# Patient Record
Sex: FEMALE | Race: WHITE | NOT HISPANIC OR LATINO | Employment: FULL TIME | URBAN - NONMETROPOLITAN AREA
[De-identification: names, ages, dates, MRNs, and addresses within clinical notes are randomized per-mention and may not be internally consistent; named-entity substitution may affect disease eponyms.]

---

## 2018-01-18 ENCOUNTER — APPOINTMENT (OUTPATIENT)
Dept: GENERAL RADIOLOGY | Facility: HOSPITAL | Age: 33
End: 2018-01-18

## 2018-01-18 PROCEDURE — 73130 X-RAY EXAM OF HAND: CPT

## 2018-05-15 ENCOUNTER — OFFICE VISIT (OUTPATIENT)
Dept: OBSTETRICS AND GYNECOLOGY | Facility: CLINIC | Age: 33
End: 2018-05-15

## 2018-05-15 VITALS
WEIGHT: 214 LBS | BODY MASS INDEX: 37.92 KG/M2 | DIASTOLIC BLOOD PRESSURE: 80 MMHG | SYSTOLIC BLOOD PRESSURE: 128 MMHG | HEIGHT: 63 IN

## 2018-05-15 DIAGNOSIS — F41.9 ANXIETY: Primary | ICD-10-CM

## 2018-05-15 DIAGNOSIS — E28.2 PCOS (POLYCYSTIC OVARIAN SYNDROME): ICD-10-CM

## 2018-05-15 PROCEDURE — 99214 OFFICE O/P EST MOD 30 MIN: CPT | Performed by: NURSE PRACTITIONER

## 2018-05-15 RX ORDER — ALPRAZOLAM 0.5 MG/1
0.5 TABLET ORAL 3 TIMES DAILY PRN
Qty: 60 TABLET | Refills: 0 | Status: SHIPPED | OUTPATIENT
Start: 2018-05-15 | End: 2019-04-05

## 2018-05-15 RX ORDER — HYDROXYZINE PAMOATE 25 MG/1
25 CAPSULE ORAL 2 TIMES DAILY
COMMUNITY
End: 2019-04-05

## 2018-05-15 NOTE — PROGRESS NOTES
Subjective   Carmen Gonzalez is a 33 y.o. female  YOB: 1985      Chief Complaint   Patient presents with   • Anxiety     Patient here with c/o anxiety. Patient takes vistoril daily but patient is still suffering with panic attacks.        Anxiety   Presents for initial visit. Onset was 1 to 4 weeks ago. The problem has been gradually worsening. Symptoms include nervous/anxious behavior. Patient reports no chest pain, confusion, dizziness, nausea, palpitations, shortness of breath or suicidal ideas. Symptoms occur most days. The severity of symptoms is interfering with daily activities. The symptoms are aggravated by family issues and work stress.     Risk factors include marital problems and a major life event. There is no history of anxiety/panic attacks, bipolar disorder, depression or suicide attempts. Treatments tried: Vistoril. The treatment provided no relief. Compliance with prior treatments has been good.       The following portions of the patient's history were reviewed and updated as appropriate: allergies, current medications, past family history, past medical history, past social history, past surgical history and problem list.    Allergies   Allergen Reactions   • Sulfa Antibiotics        Past Medical History:   Diagnosis Date   • PCOS (polycystic ovarian syndrome)        Family History   Problem Relation Age of Onset   • Breast cancer Neg Hx    • Ovarian cancer Neg Hx    • Colon cancer Neg Hx        Social History     Social History   • Marital status:      Spouse name: N/A   • Number of children: N/A   • Years of education: N/A     Occupational History   • Not on file.     Social History Main Topics   • Smoking status: Never Smoker   • Smokeless tobacco: Never Used   • Alcohol use No   • Drug use: No   • Sexual activity: Defer     Other Topics Concern   • Not on file     Social History Narrative   • No narrative on file         Current Outpatient Prescriptions:   •   hydrOXYzine (VISTARIL) 25 MG capsule, Take 25 mg by mouth 2 (Two) Times a Day., Disp: , Rfl:   •  ALPRAZolam (XANAX) 0.5 MG tablet, Take 1 tablet by mouth 3 (Three) Times a Day As Needed for Anxiety., Disp: 60 tablet, Rfl: 0    Patient's last menstrual period was 2018 (exact date).    Sexual History:         Could not be calculated    Past Surgical History:   Procedure Laterality Date   • CERVICAL BIOPSY  W/ LOOP ELECTRODE EXCISION     •  SECTION     • KIDNEY SURGERY     • TUBAL ABDOMINAL LIGATION         Review of Systems   Constitutional: Negative for activity change, appetite change, fatigue and fever.   HENT: Negative for ear pain, hearing loss, sore throat and trouble swallowing.    Eyes: Negative for pain, discharge and visual disturbance.   Respiratory: Negative for apnea, cough, chest tightness and shortness of breath.    Cardiovascular: Negative for chest pain and palpitations.   Gastrointestinal: Negative for abdominal distention, abdominal pain, blood in stool, constipation, diarrhea, nausea and vomiting.   Endocrine: Negative for heat intolerance, polydipsia and polyuria.   Genitourinary: Negative for difficulty urinating, dyspareunia, dysuria, frequency, menstrual problem, pelvic pain, urgency, vaginal bleeding, vaginal discharge and vaginal pain.   Musculoskeletal: Negative for arthralgias, back pain, joint swelling and myalgias.   Skin: Negative for rash and wound.   Allergic/Immunologic: Negative for environmental allergies and immunocompromised state.   Neurological: Negative for dizziness, tremors, seizures, numbness and headaches.   Hematological: Negative for adenopathy. Does not bruise/bleed easily.   Psychiatric/Behavioral: Negative for agitation, confusion, sleep disturbance and suicidal ideas. The patient is nervous/anxious.        Objective   Physical Exam   Constitutional: She is oriented to person, place, and time. She appears well-developed and well-nourished.  "  Cardiovascular: Normal rate and regular rhythm.    Pulmonary/Chest: Effort normal and breath sounds normal.   Neurological: She is alert and oriented to person, place, and time.   Psychiatric: She has a normal mood and affect. Her behavior is normal.   Nursing note and vitals reviewed.        Vitals:    05/15/18 1123   BP: 128/80   Weight: 97.1 kg (214 lb)   Height: 160 cm (63\")       Carmen was seen today for anxiety.    Diagnoses and all orders for this visit:    Anxiety  Comments:  Patient reports that she has a lot going on in her life (divorce for one) and has starting experiencing anxiety and panic attacks.  Discussed treatment options and risks/benefits.  RX for prn Xanax given.  To RTO in 2 weeks for follow up.  Will consider starting Effexor at that time.    Orders:  -     ALPRAZolam (XANAX) 0.5 MG tablet; Take 1 tablet by mouth 3 (Three) Times a Day As Needed for Anxiety.    PCOS (polycystic ovarian syndrome)  Comments:  Patient was diagnosed with PCOS in the past but has not had labs or taken meds recently.  PCOS labs ordered.  Patient to come in for labs when fasting.    Orders:  -     Comprehensive Metabolic Panel  -     Estradiol  -     Follicle Stimulating Hormone  -     Insulin, Total  -     Luteinizing Hormone  -     Progesterone  -     Testosterone  -     DHEA-Sulfate  -     Cortisol  -     Hemoglobin A1c          Patient's Body mass index is 37.91 kg/m². BMI is above normal parameters. Recommendations include: exercise counseling and nutrition counseling.             Non-Smoker    MyChart Instructions Given       "

## 2018-07-27 ENCOUNTER — APPOINTMENT (OUTPATIENT)
Dept: CT IMAGING | Facility: HOSPITAL | Age: 33
End: 2018-07-27

## 2018-07-27 ENCOUNTER — HOSPITAL ENCOUNTER (EMERGENCY)
Facility: HOSPITAL | Age: 33
Discharge: HOME | End: 2018-07-27
Attending: EMERGENCY MEDICINE | Admitting: EMERGENCY MEDICINE

## 2018-07-27 VITALS
BODY MASS INDEX: 37.56 KG/M2 | HEART RATE: 88 BPM | SYSTOLIC BLOOD PRESSURE: 116 MMHG | RESPIRATION RATE: 16 BRPM | HEIGHT: 63 IN | WEIGHT: 212 LBS | OXYGEN SATURATION: 98 % | TEMPERATURE: 98.7 F | DIASTOLIC BLOOD PRESSURE: 75 MMHG

## 2018-07-27 DIAGNOSIS — G43.809 OTHER MIGRAINE WITHOUT STATUS MIGRAINOSUS, NOT INTRACTABLE: Primary | ICD-10-CM

## 2018-07-27 LAB
ANION GAP SERPL CALCULATED.3IONS-SCNC: 14 MMOL/L (ref 4–13)
APTT PPP: 25.5 SECONDS (ref 24.1–34.8)
BASOPHILS # BLD AUTO: 0.05 10*3/MM3 (ref 0–0.2)
BASOPHILS NFR BLD AUTO: 0.4 % (ref 0–2)
BUN BLD-MCNC: 14 MG/DL (ref 5–21)
BUN/CREAT SERPL: 19.2 (ref 7–25)
CALCIUM SPEC-SCNC: 9.6 MG/DL (ref 8.4–10.4)
CHLORIDE SERPL-SCNC: 101 MMOL/L (ref 98–110)
CO2 SERPL-SCNC: 24 MMOL/L (ref 24–31)
CREAT BLD-MCNC: 0.73 MG/DL (ref 0.5–1.4)
DEPRECATED RDW RBC AUTO: 43.3 FL (ref 40–54)
EOSINOPHIL # BLD AUTO: 0.15 10*3/MM3 (ref 0–0.7)
EOSINOPHIL NFR BLD AUTO: 1.3 % (ref 0–4)
ERYTHROCYTE [DISTWIDTH] IN BLOOD BY AUTOMATED COUNT: 13.6 % (ref 12–15)
GFR SERPL CREATININE-BSD FRML MDRD: 91 ML/MIN/1.73
GLUCOSE BLD-MCNC: 138 MG/DL (ref 70–100)
HCT VFR BLD AUTO: 39.6 % (ref 37–47)
HGB BLD-MCNC: 13 G/DL (ref 12–16)
IMM GRANULOCYTES # BLD: 0.07 10*3/MM3 (ref 0–0.03)
IMM GRANULOCYTES NFR BLD: 0.6 % (ref 0–5)
INR PPP: 0.86 (ref 0.91–1.09)
LYMPHOCYTES # BLD AUTO: 2.17 10*3/MM3 (ref 0.72–4.86)
LYMPHOCYTES NFR BLD AUTO: 18.3 % (ref 15–45)
MCH RBC QN AUTO: 28.5 PG (ref 28–32)
MCHC RBC AUTO-ENTMCNC: 32.8 G/DL (ref 33–36)
MCV RBC AUTO: 86.8 FL (ref 82–98)
MONOCYTES # BLD AUTO: 0.56 10*3/MM3 (ref 0.19–1.3)
MONOCYTES NFR BLD AUTO: 4.7 % (ref 4–12)
NEUTROPHILS # BLD AUTO: 8.84 10*3/MM3 (ref 1.87–8.4)
NEUTROPHILS NFR BLD AUTO: 74.7 % (ref 39–78)
NRBC BLD MANUAL-RTO: 0 /100 WBC (ref 0–0)
PLATELET # BLD AUTO: 289 10*3/MM3 (ref 130–400)
PMV BLD AUTO: 10.9 FL (ref 6–12)
POTASSIUM BLD-SCNC: 4.2 MMOL/L (ref 3.5–5.3)
PROTHROMBIN TIME: 12 SECONDS (ref 11.9–14.6)
RBC # BLD AUTO: 4.56 10*6/MM3 (ref 4.2–5.4)
SODIUM BLD-SCNC: 139 MMOL/L (ref 135–145)
WBC NRBC COR # BLD: 11.84 10*3/MM3 (ref 4.8–10.8)

## 2018-07-27 PROCEDURE — 25010000002 DIPHENHYDRAMINE PER 50 MG: Performed by: EMERGENCY MEDICINE

## 2018-07-27 PROCEDURE — 99283 EMERGENCY DEPT VISIT LOW MDM: CPT

## 2018-07-27 PROCEDURE — 25010000002 DEXAMETHASONE PER 1 MG: Performed by: EMERGENCY MEDICINE

## 2018-07-27 PROCEDURE — 85610 PROTHROMBIN TIME: CPT | Performed by: EMERGENCY MEDICINE

## 2018-07-27 PROCEDURE — 25010000002 METOCLOPRAMIDE PER 10 MG: Performed by: EMERGENCY MEDICINE

## 2018-07-27 PROCEDURE — 25010000002 PROCHLORPERAZINE EDISYLATE PER 10 MG: Performed by: EMERGENCY MEDICINE

## 2018-07-27 PROCEDURE — 96365 THER/PROPH/DIAG IV INF INIT: CPT

## 2018-07-27 PROCEDURE — 80048 BASIC METABOLIC PNL TOTAL CA: CPT | Performed by: EMERGENCY MEDICINE

## 2018-07-27 PROCEDURE — 96375 TX/PRO/DX INJ NEW DRUG ADDON: CPT

## 2018-07-27 PROCEDURE — 25010000002 MAGNESIUM SULFATE IN D5W 1G/100ML (PREMIX) 1-5 GM/100ML-% SOLUTION: Performed by: EMERGENCY MEDICINE

## 2018-07-27 PROCEDURE — 70450 CT HEAD/BRAIN W/O DYE: CPT

## 2018-07-27 PROCEDURE — 85730 THROMBOPLASTIN TIME PARTIAL: CPT | Performed by: EMERGENCY MEDICINE

## 2018-07-27 PROCEDURE — 85025 COMPLETE CBC W/AUTO DIFF WBC: CPT | Performed by: EMERGENCY MEDICINE

## 2018-07-27 RX ORDER — METOCLOPRAMIDE HYDROCHLORIDE 5 MG/ML
10 INJECTION INTRAMUSCULAR; INTRAVENOUS ONCE
Status: COMPLETED | OUTPATIENT
Start: 2018-07-27 | End: 2018-07-27

## 2018-07-27 RX ORDER — DEXAMETHASONE SODIUM PHOSPHATE 10 MG/ML
10 INJECTION INTRAMUSCULAR; INTRAVENOUS ONCE
Status: COMPLETED | OUTPATIENT
Start: 2018-07-27 | End: 2018-07-27

## 2018-07-27 RX ORDER — MAGNESIUM SULFATE 1 G/100ML
2 INJECTION INTRAVENOUS ONCE
Status: COMPLETED | OUTPATIENT
Start: 2018-07-27 | End: 2018-07-27

## 2018-07-27 RX ORDER — DIPHENHYDRAMINE HYDROCHLORIDE 50 MG/ML
25 INJECTION INTRAMUSCULAR; INTRAVENOUS ONCE
Status: COMPLETED | OUTPATIENT
Start: 2018-07-27 | End: 2018-07-27

## 2018-07-27 RX ADMIN — METOCLOPRAMIDE 10 MG: 5 INJECTION, SOLUTION INTRAMUSCULAR; INTRAVENOUS at 05:37

## 2018-07-27 RX ADMIN — DIPHENHYDRAMINE HYDROCHLORIDE 25 MG: 50 INJECTION, SOLUTION INTRAMUSCULAR; INTRAVENOUS at 05:38

## 2018-07-27 RX ADMIN — DEXAMETHASONE SODIUM PHOSPHATE 10 MG: 10 INJECTION INTRAMUSCULAR; INTRAVENOUS at 06:08

## 2018-07-27 RX ADMIN — MAGNESIUM SULFATE HEPTAHYDRATE 2 G: 1 INJECTION, SOLUTION INTRAVENOUS at 06:59

## 2018-07-27 RX ADMIN — SODIUM CHLORIDE 1000 ML: 9 INJECTION, SOLUTION INTRAVENOUS at 05:35

## 2018-07-27 RX ADMIN — PROCHLORPERAZINE EDISYLATE 10 MG: 5 INJECTION INTRAMUSCULAR; INTRAVENOUS at 06:55

## 2018-09-14 ENCOUNTER — OFFICE VISIT (OUTPATIENT)
Dept: OBSTETRICS AND GYNECOLOGY | Facility: CLINIC | Age: 33
End: 2018-09-14

## 2018-09-14 VITALS
SYSTOLIC BLOOD PRESSURE: 124 MMHG | BODY MASS INDEX: 36.86 KG/M2 | HEIGHT: 63 IN | WEIGHT: 208 LBS | DIASTOLIC BLOOD PRESSURE: 72 MMHG

## 2018-09-14 DIAGNOSIS — E28.2 PCOS (POLYCYSTIC OVARIAN SYNDROME): ICD-10-CM

## 2018-09-14 DIAGNOSIS — Z13.21 ENCOUNTER FOR VITAMIN DEFICIENCY SCREENING: ICD-10-CM

## 2018-09-14 DIAGNOSIS — R53.83 OTHER FATIGUE: ICD-10-CM

## 2018-09-14 DIAGNOSIS — Z01.419 WELL WOMAN EXAM WITH ROUTINE GYNECOLOGICAL EXAM: Primary | ICD-10-CM

## 2018-09-14 DIAGNOSIS — Z12.4 CERVICAL CANCER SCREENING: ICD-10-CM

## 2018-09-14 PROCEDURE — 99395 PREV VISIT EST AGE 18-39: CPT | Performed by: NURSE PRACTITIONER

## 2018-09-14 PROCEDURE — 87491 CHLMYD TRACH DNA AMP PROBE: CPT | Performed by: NURSE PRACTITIONER

## 2018-09-14 PROCEDURE — 87591 N.GONORRHOEAE DNA AMP PROB: CPT | Performed by: NURSE PRACTITIONER

## 2018-09-14 PROCEDURE — 87661 TRICHOMONAS VAGINALIS AMPLIF: CPT | Performed by: NURSE PRACTITIONER

## 2018-09-14 PROCEDURE — 87529 HSV DNA AMP PROBE: CPT | Performed by: NURSE PRACTITIONER

## 2018-09-14 PROCEDURE — G0123 SCREEN CERV/VAG THIN LAYER: HCPCS | Performed by: NURSE PRACTITIONER

## 2018-09-14 RX ORDER — MULTIPLE VITAMINS W/ MINERALS TAB 9MG-400MCG
1 TAB ORAL DAILY
COMMUNITY
End: 2019-04-05

## 2018-09-14 NOTE — PROGRESS NOTES
Attempted to obtain health maintenance information, patient unable to provide answers for the following items Mammogram, Tdap, Colonoscopy, Pneumococcal Vaccine, Medicare Annual Wellness Exam, Diabetic Eye Exam, Diabetic Foot Exam, HPV Vaccine, Chlamydia Screening  and Zoster Vaccine.

## 2018-09-14 NOTE — PROGRESS NOTES
Subjective   Carmen Gonzalez is a 33 y.o. female  YOB: 1985        Chief Complaint   Patient presents with   • Gynecologic Exam     Pt here for yearly no probs.       Gynecologic Exam   The patient's pertinent negatives include no pelvic pain. Pertinent negatives include no abdominal pain, back pain, constipation, diarrhea, dysuria, fever, frequency, hematuria, nausea, rash, sore throat, urgency or vomiting.       The following portions of the patient's history were reviewed and updated as appropriate: allergies, current medications, past family history, past medical history, past social history, past surgical history and problem list.    Allergies   Allergen Reactions   • Sulfa Antibiotics Anaphylaxis       Past Medical History:   Diagnosis Date   • PCOS (polycystic ovarian syndrome)        Family History   Problem Relation Age of Onset   • No Known Problems Father    • Endometrial cancer Mother    • No Known Problems Brother    • Breast cancer Neg Hx    • Ovarian cancer Neg Hx    • Colon cancer Neg Hx        Social History     Social History   • Marital status:      Spouse name: N/A   • Number of children: N/A   • Years of education: N/A     Occupational History   • Not on file.     Social History Main Topics   • Smoking status: Never Smoker   • Smokeless tobacco: Never Used   • Alcohol use No   • Drug use: No   • Sexual activity: Yes     Other Topics Concern   • Not on file     Social History Narrative   • No narrative on file         Current Outpatient Prescriptions:   •  Multiple Vitamins-Minerals (MULTIVITAMIN WITH MINERALS) tablet tablet, Take 1 tablet by mouth Daily., Disp: , Rfl:   •  ALPRAZolam (XANAX) 0.5 MG tablet, Take 1 tablet by mouth 3 (Three) Times a Day As Needed for Anxiety., Disp: 60 tablet, Rfl: 0  •  hydrOXYzine (VISTARIL) 25 MG capsule, Take 25 mg by mouth 2 (Two) Times a Day., Disp: , Rfl:     Patient's last menstrual period was 09/09/2018 (exact date).    Sexual  History:         Could not be calculated    Past Surgical History:   Procedure Laterality Date   • CERVICAL BIOPSY  W/ LOOP ELECTRODE EXCISION     •  SECTION     • KIDNEY SURGERY     • TUBAL ABDOMINAL LIGATION         Review of Systems   Constitutional: Negative for activity change, appetite change, fatigue, fever, unexpected weight gain and unexpected weight loss.   HENT: Negative for congestion, ear pain, hearing loss, nosebleeds, rhinorrhea, sore throat, tinnitus and trouble swallowing.    Eyes: Negative for blurred vision, pain, discharge, itching and visual disturbance.   Respiratory: Negative for apnea, chest tightness, shortness of breath and wheezing.    Cardiovascular: Negative for chest pain and leg swelling.   Gastrointestinal: Negative for abdominal pain, blood in stool, constipation, diarrhea, nausea, vomiting and GERD.   Endocrine: Negative for heat intolerance, polydipsia and polyuria.   Genitourinary: Negative for urinary incontinence, decreased libido, difficulty urinating, dyspareunia, dysuria, frequency, genital sores, hematuria, menstrual problem, pelvic pain, urgency, vaginal pain and breast lump.   Musculoskeletal: Negative for arthralgias, back pain, joint swelling and myalgias.   Skin: Negative for color change, rash and skin lesions.   Allergic/Immunologic: Negative for environmental allergies, food allergies and immunocompromised state.   Neurological: Negative for dizziness, tremors, seizures, syncope, facial asymmetry, numbness and headache.   Hematological: Negative for adenopathy. Does not bruise/bleed easily.   Psychiatric/Behavioral: Negative for agitation, hallucinations, sleep disturbance, suicidal ideas and depressed mood. The patient is not nervous/anxious.        Objective   Physical Exam   Constitutional: She is oriented to person, place, and time. She appears well-developed and well-nourished. No distress.   HENT:   Head: Normocephalic.   Right Ear: External ear  normal.   Left Ear: External ear normal.   Nose: Nose normal.   Mouth/Throat: Oropharynx is clear and moist.   Eyes: Conjunctivae are normal. Right eye exhibits no discharge. Left eye exhibits no discharge. No scleral icterus.   Neck: Normal range of motion. Neck supple. Carotid bruit is not present. No tracheal deviation present. No thyromegaly present.   Cardiovascular: Normal rate, regular rhythm, normal heart sounds and intact distal pulses.    No murmur heard.  Pulmonary/Chest: Effort normal and breath sounds normal. No respiratory distress. She has no wheezes. Right breast exhibits no inverted nipple, no mass, no nipple discharge, no skin change and no tenderness. Left breast exhibits no inverted nipple, no mass, no nipple discharge, no skin change and no tenderness. Breasts are symmetrical. There is no breast swelling.   Abdominal: Soft. She exhibits no distension and no mass. There is no tenderness. There is no guarding. No hernia. Hernia confirmed negative in the right inguinal area and confirmed negative in the left inguinal area.   Genitourinary: Rectum normal, vagina normal and uterus normal. Rectal exam shows no mass. No breast tenderness, discharge or bleeding. Pelvic exam was performed with patient supine. There is no rash, tenderness, lesion or injury on the right labia. There is no rash, tenderness, lesion or injury on the left labia. Uterus is not enlarged, not fixed and not tender. Cervix exhibits no motion tenderness, no discharge and no friability. Right adnexum displays no mass, no tenderness and no fullness. Left adnexum displays no mass, no tenderness and no fullness. No erythema, tenderness or bleeding in the vagina. No foreign body in the vagina. No signs of injury around the vagina. No vaginal discharge found.   Genitourinary Comments:   BSU normal  Urethral meatus  Normal  Perineum  Normal   Musculoskeletal: Normal range of motion. She exhibits no edema or tenderness.   Lymphadenopathy:  "       Head (right side): No submental, no submandibular, no tonsillar, no preauricular, no posterior auricular and no occipital adenopathy present.        Head (left side): No submental, no submandibular, no tonsillar, no preauricular, no posterior auricular and no occipital adenopathy present.     She has no cervical adenopathy.        Right cervical: No superficial cervical, no deep cervical and no posterior cervical adenopathy present.       Left cervical: No superficial cervical, no deep cervical and no posterior cervical adenopathy present.     She has no axillary adenopathy.        Right: No inguinal adenopathy present.        Left: No inguinal adenopathy present.   Neurological: She is alert and oriented to person, place, and time. Coordination normal.   Skin: Skin is warm and dry. No bruising and no rash noted. She is not diaphoretic. No erythema.   Psychiatric: She has a normal mood and affect. Her behavior is normal. Judgment and thought content normal.   Nursing note and vitals reviewed.        Vitals:    09/14/18 0930   BP: 124/72   Weight: 94.3 kg (208 lb)   Height: 160 cm (63\")       Carmen was seen today for gynecologic exam.    Diagnoses and all orders for this visit:    Well woman exam with routine gynecological exam  Comments:  Normal well woman exam.  Thin prep pap smear done.    Orders:  -     T4, Free  -     CBC & Differential  -     Lipid Panel With LDL / HDL Ratio  -     TSH  -     Comprehensive Metabolic Panel    Cervical cancer screening  Comments:  Thin prep pap smear done.     PCOS (polycystic ovarian syndrome)  Comments:  Patient had a lab order for PCOS labs in May of this year but never had them done.  Is fasting today and wants to have labs done.  PCOS panel ordered.   Orders:  -     Estradiol  -     Follicle Stimulating Hormone  -     Insulin, Total  -     Luteinizing Hormone  -     Progesterone  -     Testosterone  -     DHEA-Sulfate  -     Cortisol  -     Hemoglobin A1c    Encounter " for vitamin deficiency screening  Comments:  Vitamin D retesting ordered.   Orders:  -     Vitamin D 25 Hydroxy    Other fatigue  Comments:  Yearly labs done.   Orders:  -     T4, Free  -     CBC & Differential  -     Lipid Panel With LDL / HDL Ratio  -     TSH  -     Comprehensive Metabolic Panel  -     Estradiol  -     Follicle Stimulating Hormone  -     Insulin, Total  -     Luteinizing Hormone  -     Progesterone  -     Testosterone  -     DHEA-Sulfate  -     Cortisol  -     Hemoglobin A1c          Patient's Body mass index is 36.85 kg/m². BMI is above normal parameters. Recommendations include: exercise counseling and nutrition counseling.             Non-Smoker    MyChart Instructions Given

## 2018-09-17 LAB
25(OH)D3+25(OH)D2 SERPL-MCNC: 40.1 NG/ML (ref 30–100)
ALBUMIN SERPL-MCNC: 4.3 G/DL (ref 3.5–5)
ALBUMIN/GLOB SERPL: 1.5 G/DL (ref 1.1–2.5)
ALP SERPL-CCNC: 67 U/L (ref 24–120)
ALT SERPL-CCNC: 29 U/L (ref 0–54)
AST SERPL-CCNC: 18 U/L (ref 7–45)
BASOPHILS # BLD AUTO: 0.04 10*3/MM3 (ref 0–0.2)
BASOPHILS NFR BLD AUTO: 0.6 % (ref 0–2)
BILIRUB SERPL-MCNC: 0.5 MG/DL (ref 0.1–1)
BUN SERPL-MCNC: 15 MG/DL (ref 5–21)
BUN/CREAT SERPL: 23.4 (ref 7–25)
CALCIUM SERPL-MCNC: 9.7 MG/DL (ref 8.4–10.4)
CHLORIDE SERPL-SCNC: 105 MMOL/L (ref 98–110)
CHOLEST SERPL-MCNC: 195 MG/DL (ref 130–200)
CO2 SERPL-SCNC: 27 MMOL/L (ref 24–31)
CORTIS SERPL-MCNC: 13.5 UG/DL
CREAT SERPL-MCNC: 0.64 MG/DL (ref 0.5–1.4)
DHEA-S SERPL-MCNC: 504.9 UG/DL (ref 84.8–378)
EOSINOPHIL # BLD AUTO: 0.12 10*3/MM3 (ref 0–0.7)
EOSINOPHIL NFR BLD AUTO: 1.7 % (ref 0–4)
ERYTHROCYTE [DISTWIDTH] IN BLOOD BY AUTOMATED COUNT: 14.1 % (ref 12–15)
ESTRADIOL SERPL-MCNC: 35.5 PG/ML
FSH SERPL-ACNC: 7.4 MIU/ML
GLOBULIN SER CALC-MCNC: 2.9 GM/DL
GLUCOSE SERPL-MCNC: 92 MG/DL (ref 70–100)
HBA1C MFR BLD: 5.5 %
HCT VFR BLD AUTO: 40.6 % (ref 37–47)
HDLC SERPL-MCNC: 70 MG/DL
HGB BLD-MCNC: 12.8 G/DL (ref 12–16)
IMM GRANULOCYTES # BLD: 0.01 10*3/MM3 (ref 0–0.03)
IMM GRANULOCYTES NFR BLD: 0.1 % (ref 0–5)
INSULIN SERPL-ACNC: 14.8 UIU/ML (ref 2.6–24.9)
LDLC SERPL CALC-MCNC: 116 MG/DL (ref 0–99)
LDLC/HDLC SERPL: 1.66 {RATIO}
LH SERPL-ACNC: 4.9 MIU/ML
LYMPHOCYTES # BLD AUTO: 2.2 10*3/MM3 (ref 0.72–4.86)
LYMPHOCYTES NFR BLD AUTO: 31.5 % (ref 15–45)
MCH RBC QN AUTO: 29 PG (ref 28–32)
MCHC RBC AUTO-ENTMCNC: 31.5 G/DL (ref 33–36)
MCV RBC AUTO: 91.9 FL (ref 82–98)
MONOCYTES # BLD AUTO: 0.32 10*3/MM3 (ref 0.19–1.3)
MONOCYTES NFR BLD AUTO: 4.6 % (ref 4–12)
NEUTROPHILS # BLD AUTO: 4.3 10*3/MM3 (ref 1.87–8.4)
NEUTROPHILS NFR BLD AUTO: 61.5 % (ref 39–78)
NRBC BLD AUTO-RTO: 0 /100 WBC (ref 0–0)
PLATELET # BLD AUTO: 293 10*3/MM3 (ref 130–400)
POTASSIUM SERPL-SCNC: 4.6 MMOL/L (ref 3.5–5.3)
PROGEST SERPL-MCNC: 0.3 NG/ML
PROT SERPL-MCNC: 7.2 G/DL (ref 6.3–8.7)
RBC # BLD AUTO: 4.42 10*6/MM3 (ref 4.2–5.4)
SODIUM SERPL-SCNC: 141 MMOL/L (ref 135–145)
T4 FREE SERPL-MCNC: 1.09 NG/DL (ref 0.78–2.19)
TESTOST SERPL-MCNC: 85 NG/DL (ref 8–48)
TRIGL SERPL-MCNC: 43 MG/DL (ref 0–149)
TSH SERPL DL<=0.005 MIU/L-ACNC: 1.32 MIU/ML (ref 0.47–4.68)
VLDLC SERPL CALC-MCNC: 8.6 MG/DL
WBC # BLD AUTO: 6.99 10*3/MM3 (ref 4.8–10.8)

## 2018-09-20 DIAGNOSIS — E28.1 ELEVATED ANDROGEN LEVELS: Primary | ICD-10-CM

## 2018-09-20 RX ORDER — SPIRONOLACTONE 25 MG/1
25 TABLET ORAL 2 TIMES DAILY
Qty: 60 TABLET | Refills: 2 | Status: SHIPPED | OUTPATIENT
Start: 2018-09-20 | End: 2019-04-05

## 2018-09-21 LAB
GEN CATEG CVX/VAG CYTO-IMP: NORMAL
LAB AP CASE REPORT: NORMAL
LAB AP GYN ADDITIONAL INFORMATION: NORMAL
LAB AP GYN OTHER FINDINGS: NORMAL
PATH INTERP SPEC-IMP: NORMAL
STAT OF ADQ CVX/VAG CYTO-IMP: NORMAL

## 2019-03-19 ENCOUNTER — OFFICE VISIT (OUTPATIENT)
Dept: OBSTETRICS AND GYNECOLOGY | Facility: CLINIC | Age: 34
End: 2019-03-19

## 2019-03-19 VITALS
DIASTOLIC BLOOD PRESSURE: 84 MMHG | BODY MASS INDEX: 39.69 KG/M2 | WEIGHT: 224 LBS | SYSTOLIC BLOOD PRESSURE: 124 MMHG | HEIGHT: 63 IN

## 2019-03-19 DIAGNOSIS — Z20.2 POSSIBLE EXPOSURE TO STD: Primary | ICD-10-CM

## 2019-03-19 PROCEDURE — 87661 TRICHOMONAS VAGINALIS AMPLIF: CPT | Performed by: NURSE PRACTITIONER

## 2019-03-19 PROCEDURE — 99213 OFFICE O/P EST LOW 20 MIN: CPT | Performed by: NURSE PRACTITIONER

## 2019-03-19 PROCEDURE — 87591 N.GONORRHOEAE DNA AMP PROB: CPT | Performed by: NURSE PRACTITIONER

## 2019-03-19 PROCEDURE — 87491 CHLMYD TRACH DNA AMP PROBE: CPT | Performed by: NURSE PRACTITIONER

## 2019-03-19 NOTE — PROGRESS NOTES
Subjective   Carmen Gonzalez is a 33 y.o. female  YOB: 1985      Chief Complaint   Patient presents with   • Other     Patient woud not discuss with nurse.        Exposure to STD    The patient's pertinent negatives include no dyspareunia, dysuria or pelvic pain. Pertinent negatives include no abdominal pain, diaphoresis, fever, rectal pain, sore throat or urinary frequency.       The following portions of the patient's history were reviewed and updated as appropriate: allergies, current medications, past family history, past medical history, past social history, past surgical history and problem list.    Allergies   Allergen Reactions   • Sulfa Antibiotics Anaphylaxis       Past Medical History:   Diagnosis Date   • PCOS (polycystic ovarian syndrome)        Family History   Problem Relation Age of Onset   • No Known Problems Father    • Endometrial cancer Mother    • No Known Problems Brother    • Breast cancer Neg Hx    • Ovarian cancer Neg Hx    • Colon cancer Neg Hx        Social History     Socioeconomic History   • Marital status:      Spouse name: Not on file   • Number of children: Not on file   • Years of education: Not on file   • Highest education level: Not on file   Social Needs   • Financial resource strain: Not on file   • Food insecurity - worry: Not on file   • Food insecurity - inability: Not on file   • Transportation needs - medical: Not on file   • Transportation needs - non-medical: Not on file   Occupational History   • Not on file   Tobacco Use   • Smoking status: Never Smoker   • Smokeless tobacco: Never Used   Substance and Sexual Activity   • Alcohol use: No   • Drug use: No   • Sexual activity: Yes   Other Topics Concern   • Not on file   Social History Narrative   • Not on file         Current Outpatient Medications:   •  ALPRAZolam (XANAX) 0.5 MG tablet, Take 1 tablet by mouth 3 (Three) Times a Day As Needed for Anxiety., Disp: 60 tablet, Rfl: 0  •  hydrOXYzine  (VISTARIL) 25 MG capsule, Take 25 mg by mouth 2 (Two) Times a Day., Disp: , Rfl:   •  Multiple Vitamins-Minerals (MULTIVITAMIN WITH MINERALS) tablet tablet, Take 1 tablet by mouth Daily., Disp: , Rfl:   •  spironolactone (ALDACTONE) 25 MG tablet, Take 1 tablet by mouth 2 (Two) Times a Day., Disp: 60 tablet, Rfl: 2    No LMP recorded.    Sexual History:         Could not be calculated    Past Surgical History:   Procedure Laterality Date   • CERVICAL BIOPSY  W/ LOOP ELECTRODE EXCISION     •  SECTION     • KIDNEY SURGERY     • TUBAL ABDOMINAL LIGATION         Review of Systems   Constitutional: Negative for activity change, appetite change, chills, diaphoresis, fatigue, fever and unexpected weight change.   HENT: Negative for congestion, dental problem, drooling, ear discharge, ear pain, facial swelling, hearing loss, mouth sores, nosebleeds, postnasal drip, rhinorrhea, sinus pressure, sinus pain, sneezing, sore throat, tinnitus, trouble swallowing and voice change.    Eyes: Negative for photophobia, pain, discharge, redness, itching and visual disturbance.   Respiratory: Negative for apnea, cough, choking, chest tightness, shortness of breath, wheezing and stridor.    Cardiovascular: Negative for chest pain, palpitations and leg swelling.   Gastrointestinal: Negative for abdominal distention, abdominal pain, anal bleeding, blood in stool, constipation, diarrhea, nausea, rectal pain and vomiting.   Endocrine: Negative for cold intolerance, heat intolerance, polydipsia, polyphagia and polyuria.   Genitourinary: Negative for decreased urine volume, difficulty urinating, dyspareunia, dysuria, enuresis, flank pain, frequency, genital sores, hematuria, menstrual problem, pelvic pain, urgency, vaginal bleeding, vaginal discharge and vaginal pain.   Musculoskeletal: Negative for arthralgias, back pain, gait problem, joint swelling, myalgias, neck pain and neck stiffness.   Skin: Positive for wound (rectal  "fissure). Negative for color change, pallor and rash.   Allergic/Immunologic: Negative for environmental allergies, food allergies and immunocompromised state.   Neurological: Negative for dizziness, tremors, seizures, syncope, facial asymmetry, speech difficulty, weakness, light-headedness, numbness and headaches.   Hematological: Negative for adenopathy. Does not bruise/bleed easily.   Psychiatric/Behavioral: Negative for agitation, behavioral problems, confusion, decreased concentration, dysphoric mood, hallucinations, self-injury, sleep disturbance and suicidal ideas. The patient is not nervous/anxious and is not hyperactive.        Objective   Physical Exam   Constitutional: She is oriented to person, place, and time. She appears well-developed and well-nourished.   HENT:   Head: Normocephalic.   Eyes: Pupils are equal, round, and reactive to light.   Neck: Normal range of motion.   Cardiovascular: Normal rate and regular rhythm.   Pulmonary/Chest: Effort normal and breath sounds normal.   Abdominal: Soft.   Musculoskeletal: Normal range of motion.   Neurological: She is alert and oriented to person, place, and time.   Skin: Skin is warm and dry.   Psychiatric: She has a normal mood and affect. Her behavior is normal.   Nursing note and vitals reviewed.        Vitals:    03/19/19 0821   BP: 124/84   Weight: 102 kg (224 lb)   Height: 160 cm (63\")       Carmen was seen today for other.    Diagnoses and all orders for this visit:    Possible exposure to STD  Comments:  Patient reports her ex partner recently told her that he has genital herpes.  Patient reports an open area near her rectum but otherwise asymptomatic.  On exam, there is a small split near rectum c/w a fissure.  HSV culture done.  STD panel done - will f/u pending results.   Orders:  -     Gynecologic Fluid, Supplemental Testing  -     Herpes Simplex Typing - Swab, Vaginal/Rectum          Patient's Body mass index is 39.68 kg/m². BMI is above normal " parameters. Recommendations include: exercise counseling, nutrition counseling and pharmacological intervention.             Non-Smoker    MyChart Instructions Given

## 2019-03-20 DIAGNOSIS — A54.9 GONORRHEA: Primary | ICD-10-CM

## 2019-03-20 LAB
LAB AP CASE REPORT: NORMAL
Lab: NORMAL
TRICHOMONAS VAGINALIS PCR: NOT DETECTED

## 2019-03-20 RX ORDER — AZITHROMYCIN 500 MG/1
1000 TABLET, FILM COATED ORAL ONCE
Qty: 2 TABLET | Refills: 0 | Status: SHIPPED | OUTPATIENT
Start: 2019-03-20 | End: 2019-03-20

## 2019-03-20 RX ORDER — CEFTRIAXONE SODIUM 250 MG/1
250 INJECTION, POWDER, FOR SOLUTION INTRAMUSCULAR; INTRAVENOUS ONCE
Status: DISCONTINUED | OUTPATIENT
Start: 2019-03-20 | End: 2019-04-05

## 2019-03-20 NOTE — PROGRESS NOTES
Please let patient know that her testing was positive for gonorrhea - med has been sent to the pharmacy and she'll need to come in for Rocephin shot

## 2019-03-21 ENCOUNTER — CLINICAL SUPPORT (OUTPATIENT)
Dept: OBSTETRICS AND GYNECOLOGY | Facility: CLINIC | Age: 34
End: 2019-03-21

## 2019-03-21 DIAGNOSIS — A54.9 GONORRHEA: Primary | ICD-10-CM

## 2019-03-21 DIAGNOSIS — Z20.2 EXPOSURE TO SEXUALLY TRANSMITTED DISEASE (STD): Primary | ICD-10-CM

## 2019-03-21 LAB — HSV SPEC CULT: ABNORMAL

## 2019-03-21 PROCEDURE — 96372 THER/PROPH/DIAG INJ SC/IM: CPT | Performed by: NURSE PRACTITIONER

## 2019-03-21 RX ORDER — CEFTRIAXONE SODIUM 250 MG/1
250 INJECTION, POWDER, FOR SOLUTION INTRAMUSCULAR; INTRAVENOUS EVERY 24 HOURS
Status: COMPLETED | OUTPATIENT
Start: 2019-03-21 | End: 2019-03-21

## 2019-03-21 RX ADMIN — CEFTRIAXONE SODIUM 250 MG: 250 INJECTION, POWDER, FOR SOLUTION INTRAMUSCULAR; INTRAVENOUS at 09:32

## 2019-03-22 DIAGNOSIS — B00.9 HSV-2 (HERPES SIMPLEX VIRUS 2) INFECTION: Primary | ICD-10-CM

## 2019-03-22 LAB
HAV IGM SERPL QL IA: NEGATIVE
HBV CORE IGM SERPL QL IA: NEGATIVE
HBV SURFACE AG SERPL QL IA: NEGATIVE
HCV AB S/CO SERPL IA: <0.1 S/CO RATIO (ref 0–0.9)
HIV 1+2 AB+HIV1 P24 AG SERPL QL IA: NON REACTIVE
RPR SER QL: NON REACTIVE

## 2019-03-22 RX ORDER — VALACYCLOVIR HYDROCHLORIDE 500 MG/1
500 TABLET, FILM COATED ORAL 2 TIMES DAILY
Qty: 30 TABLET | Refills: 5 | Status: SHIPPED | OUTPATIENT
Start: 2019-03-22 | End: 2019-03-25

## 2019-03-22 NOTE — PROGRESS NOTES
Please let patient know her HSV culture was positive for herpes type 2.  Valtrex RX has been sent to her pharmacy.

## 2019-04-05 ENCOUNTER — OFFICE VISIT (OUTPATIENT)
Dept: OBSTETRICS AND GYNECOLOGY | Facility: CLINIC | Age: 34
End: 2019-04-05

## 2019-04-05 VITALS
DIASTOLIC BLOOD PRESSURE: 70 MMHG | HEIGHT: 63 IN | BODY MASS INDEX: 39.16 KG/M2 | SYSTOLIC BLOOD PRESSURE: 124 MMHG | WEIGHT: 221 LBS

## 2019-04-05 DIAGNOSIS — R30.0 DYSURIA: ICD-10-CM

## 2019-04-05 DIAGNOSIS — Z20.2 EXPOSURE TO STD: Primary | ICD-10-CM

## 2019-04-05 PROCEDURE — 87512 GARDNER VAG DNA QUANT: CPT | Performed by: NURSE PRACTITIONER

## 2019-04-05 PROCEDURE — 87491 CHLMYD TRACH DNA AMP PROBE: CPT | Performed by: NURSE PRACTITIONER

## 2019-04-05 PROCEDURE — 87481 CANDIDA DNA AMP PROBE: CPT | Performed by: NURSE PRACTITIONER

## 2019-04-05 PROCEDURE — 87798 DETECT AGENT NOS DNA AMP: CPT | Performed by: NURSE PRACTITIONER

## 2019-04-05 PROCEDURE — 87591 N.GONORRHOEAE DNA AMP PROB: CPT | Performed by: NURSE PRACTITIONER

## 2019-04-05 PROCEDURE — 99213 OFFICE O/P EST LOW 20 MIN: CPT | Performed by: NURSE PRACTITIONER

## 2019-04-05 PROCEDURE — 87529 HSV DNA AMP PROBE: CPT | Performed by: NURSE PRACTITIONER

## 2019-04-05 PROCEDURE — 87661 TRICHOMONAS VAGINALIS AMPLIF: CPT | Performed by: NURSE PRACTITIONER

## 2019-04-05 NOTE — PROGRESS NOTES
Subjective   Carmen Gonzalez is a 33 y.o. female  YOB: 1985      Chief Complaint   Patient presents with   • Follow-up     Patient here for a ANA after testing positive for gonorrhea.       Patient here for f/u ANA after being diagnosed with gonorrhea and HSV2        The following portions of the patient's history were reviewed and updated as appropriate: allergies, current medications, past family history, past medical history, past social history, past surgical history and problem list.    Allergies   Allergen Reactions   • Sulfa Antibiotics Anaphylaxis       Past Medical History:   Diagnosis Date   • PCOS (polycystic ovarian syndrome)        Family History   Problem Relation Age of Onset   • No Known Problems Father    • Endometrial cancer Mother    • No Known Problems Brother    • Breast cancer Neg Hx    • Ovarian cancer Neg Hx    • Colon cancer Neg Hx        Social History     Socioeconomic History   • Marital status:      Spouse name: Not on file   • Number of children: Not on file   • Years of education: Not on file   • Highest education level: Not on file   Tobacco Use   • Smoking status: Never Smoker   • Smokeless tobacco: Never Used   Substance and Sexual Activity   • Alcohol use: No   • Drug use: No   • Sexual activity: Yes       No current outpatient medications on file.  No current facility-administered medications for this visit.     No LMP recorded.    Sexual History:         Could not be calculated    Past Surgical History:   Procedure Laterality Date   • CERVICAL BIOPSY  W/ LOOP ELECTRODE EXCISION     •  SECTION     • KIDNEY SURGERY     • TUBAL ABDOMINAL LIGATION         Review of Systems   Constitutional: Negative for activity change, appetite change, chills, diaphoresis, fatigue, fever and unexpected weight change.   HENT: Negative for congestion, dental problem, drooling, ear discharge, ear pain, facial swelling, hearing loss, mouth sores, nosebleeds, postnasal  drip, rhinorrhea, sinus pressure, sinus pain, sneezing, sore throat, tinnitus, trouble swallowing and voice change.    Eyes: Negative for photophobia, pain, discharge, redness, itching and visual disturbance.   Respiratory: Negative for apnea, cough, choking, chest tightness, shortness of breath, wheezing and stridor.    Cardiovascular: Negative for chest pain, palpitations and leg swelling.   Gastrointestinal: Negative for abdominal distention, abdominal pain, anal bleeding, blood in stool, constipation, diarrhea, nausea, rectal pain and vomiting.   Endocrine: Negative for cold intolerance, heat intolerance, polydipsia, polyphagia and polyuria.   Genitourinary: Positive for dysuria. Negative for decreased urine volume, difficulty urinating, dyspareunia, enuresis, flank pain, frequency, genital sores, hematuria, menstrual problem, pelvic pain, urgency, vaginal bleeding, vaginal discharge and vaginal pain.   Musculoskeletal: Negative for arthralgias, back pain, gait problem, joint swelling, myalgias, neck pain and neck stiffness.   Skin: Negative for color change, pallor, rash and wound.   Allergic/Immunologic: Negative for environmental allergies, food allergies and immunocompromised state.   Neurological: Negative for dizziness, tremors, seizures, syncope, facial asymmetry, speech difficulty, weakness, light-headedness, numbness and headaches.   Hematological: Negative for adenopathy. Does not bruise/bleed easily.   Psychiatric/Behavioral: Negative for agitation, behavioral problems, confusion, decreased concentration, dysphoric mood, hallucinations, self-injury, sleep disturbance and suicidal ideas. The patient is not nervous/anxious and is not hyperactive.        Objective   Physical Exam   Constitutional: She is oriented to person, place, and time. She appears well-developed and well-nourished.   HENT:   Head: Normocephalic.   Eyes: Pupils are equal, round, and reactive to light.   Neck: Normal range of motion.  "  Cardiovascular: Normal rate and regular rhythm.   Pulmonary/Chest: Effort normal and breath sounds normal.   Abdominal: Soft.   Musculoskeletal: Normal range of motion.   Neurological: She is alert and oriented to person, place, and time.   Skin: Skin is warm and dry.   Psychiatric: She has a normal mood and affect. Her behavior is normal.   Nursing note and vitals reviewed.        Vitals:    04/05/19 1022   BP: 124/70   Weight: 100 kg (221 lb)   Height: 160 cm (63\")       Carmen was seen today for follow-up.    Diagnoses and all orders for this visit:    Exposure to STD  Comments:  Patient here for ANA after testing positive for gonorrhea, HSV and   Orders:  -     Cancel: Gynecologic Fluid, Supplemental Testing  -     Gynecologic Fluid, Supplemental Testing    Dysuria  Comments:  Patient reports ongoing dysuria.  Urine culture done today - will f/u pending results.   Orders:  -     Urine Culture - Urine, Urine, Clean Catch          Patient's Body mass index is 39.15 kg/m². BMI is above normal parameters. Recommendations include: exercise counseling and nutrition counseling.             Non-Smoker    MyChart Instructions Given       "

## 2019-04-07 LAB
BACTERIA UR CULT: NO GROWTH
BACTERIA UR CULT: NORMAL

## 2019-04-08 ENCOUNTER — TELEPHONE (OUTPATIENT)
Dept: OBSTETRICS AND GYNECOLOGY | Facility: CLINIC | Age: 34
End: 2019-04-08

## 2019-04-08 NOTE — TELEPHONE ENCOUNTER
Pt left voicemail saying she was given sample of vibryd and coupon Friday but no script was sent to the pharmacy to find out if insurance will cover or what her copay may be.

## 2019-04-09 DIAGNOSIS — F32.A DEPRESSION, UNSPECIFIED DEPRESSION TYPE: ICD-10-CM

## 2019-04-09 DIAGNOSIS — F41.9 ANXIETY: Primary | ICD-10-CM

## 2019-04-09 DIAGNOSIS — F41.8 DEPRESSION WITH ANXIETY: Primary | ICD-10-CM

## 2019-04-09 RX ORDER — VILAZODONE HYDROCHLORIDE 20 MG/1
20 TABLET ORAL DAILY
Qty: 30 TABLET | Refills: 5 | Status: SHIPPED | OUTPATIENT
Start: 2019-04-09 | End: 2019-04-09

## 2019-04-09 RX ORDER — ESCITALOPRAM OXALATE 20 MG/1
20 TABLET ORAL DAILY
Qty: 30 TABLET | Refills: 2 | Status: ON HOLD | OUTPATIENT
Start: 2019-04-09 | End: 2019-07-29

## 2019-04-09 RX ORDER — ESCITALOPRAM OXALATE 10 MG/1
10 TABLET ORAL DAILY
Qty: 7 TABLET | Refills: 0 | Status: SHIPPED | OUTPATIENT
Start: 2019-04-09 | End: 2019-04-26 | Stop reason: DRUGHIGH

## 2019-04-09 NOTE — TELEPHONE ENCOUNTER
Pt got the pharmacy to run and its partially covered and with coupon card its still a little over 100$ and she cant pay that every month. She is asking for a different medication. She said she has taken lexapro and Effexor before and they worked well she just needs something that can help with her anxiety as well. Please advise

## 2019-04-09 NOTE — TELEPHONE ENCOUNTER
Sent Viibryd 20 mg to her pharmacy.  Please let her know and verify that the 20 mg is the correct dose.

## 2019-04-10 LAB
C TRACH RRNA CVX QL NAA+PROBE: NOT DETECTED
HSV1 DNA SPEC QL NAA+PROBE: NOT DETECTED
HSV2 DNA SPEC QL NAA+PROBE: NOT DETECTED
N GONORRHOEA RRNA SPEC QL NAA+PROBE: NOT DETECTED
TRICHOMONAS VAGINALIS PCR: NOT DETECTED

## 2019-04-12 LAB
GEN CATEG CVX/VAG CYTO-IMP: NORMAL
LAB AP CASE REPORT: NORMAL
Lab: NORMAL
PATH INTERP SPEC-IMP: NORMAL
STAT OF ADQ CVX/VAG CYTO-IMP: NORMAL

## 2019-04-26 ENCOUNTER — OFFICE VISIT (OUTPATIENT)
Dept: OBSTETRICS AND GYNECOLOGY | Facility: CLINIC | Age: 34
End: 2019-04-26

## 2019-04-26 VITALS
DIASTOLIC BLOOD PRESSURE: 84 MMHG | BODY MASS INDEX: 39.69 KG/M2 | WEIGHT: 224 LBS | HEIGHT: 63 IN | SYSTOLIC BLOOD PRESSURE: 120 MMHG

## 2019-04-26 DIAGNOSIS — R30.0 DYSURIA: Primary | ICD-10-CM

## 2019-04-26 LAB
BILIRUB BLD-MCNC: NEGATIVE MG/DL
CLARITY, POC: ABNORMAL
COLOR UR: ABNORMAL
GLUCOSE UR STRIP-MCNC: NEGATIVE MG/DL
KETONES UR QL: ABNORMAL
LEUKOCYTE EST, POC: ABNORMAL
NITRITE UR-MCNC: POSITIVE MG/ML
PH UR: 5 [PH] (ref 5–8)
PROT UR STRIP-MCNC: NEGATIVE MG/DL
RBC # UR STRIP: NEGATIVE /UL
SP GR UR: 1.02 (ref 1–1.03)
UROBILINOGEN UR QL: NORMAL

## 2019-04-26 PROCEDURE — 99213 OFFICE O/P EST LOW 20 MIN: CPT | Performed by: OBSTETRICS & GYNECOLOGY

## 2019-04-26 PROCEDURE — 81003 URINALYSIS AUTO W/O SCOPE: CPT | Performed by: OBSTETRICS & GYNECOLOGY

## 2019-04-26 NOTE — PROGRESS NOTES
"Carmen Gonzalez is a 33 y.o. female here today for evaluation of dysuria.  Over the last week she has had discomfort with urination.  1 month ago she was diagnosed with gonorrhea and genital herpes.  She was treated and had a negative test of cure on April 5.  She is concerned because she was having similar symptoms prior to that.  She denies fevers or back pain.    Visit Vitals  /84   Ht 160 cm (63\")   Wt 102 kg (224 lb)   LMP 04/05/2019 (Exact Date)   Breastfeeding? No   BMI 39.68 kg/m²     Pleasant female no acute distress  Mood and affect normal  Breathing unlabored  Abdomen nontender    A wet prep performed in the office is normal    Assessment: Dysuria    I see no concerning findings on her exam.  We will check a urine culture and notify her when the results are available.  I recommend that she take a daily probiotic in the meantime.      "

## 2019-04-30 LAB
BACTERIA UR CULT: ABNORMAL
BACTERIA UR CULT: ABNORMAL
OTHER ANTIBIOTIC SUSC ISLT: ABNORMAL

## 2019-05-01 ENCOUNTER — TELEPHONE (OUTPATIENT)
Dept: OBSTETRICS AND GYNECOLOGY | Facility: CLINIC | Age: 34
End: 2019-05-01

## 2019-05-01 RX ORDER — AMOXICILLIN AND CLAVULANATE POTASSIUM 875; 125 MG/1; MG/1
1 TABLET, FILM COATED ORAL 2 TIMES DAILY
Qty: 10 TABLET | Refills: 0 | Status: SHIPPED | OUTPATIENT
Start: 2019-05-01 | End: 2019-05-06

## 2019-05-01 NOTE — TELEPHONE ENCOUNTER
----- Message from Maix Estrada MD sent at 5/1/2019  7:36 AM CDT -----  Notify patient positive urine culture, Rx Augmentin 875 BID for 5 days

## 2019-06-05 ENCOUNTER — TELEPHONE (OUTPATIENT)
Dept: BARIATRICS/WEIGHT MGMT | Facility: CLINIC | Age: 34
End: 2019-06-05

## 2019-06-05 NOTE — TELEPHONE ENCOUNTER
Patient came in and wanted to schedule a new patient appointment. She is scheduled tomorrow 06/06/2019 and she was given her paperwork. I also informed her that our program as well as her insurance requires 6 consecutive monthly visits. Patient voiced understanding.

## 2019-07-29 ENCOUNTER — APPOINTMENT (OUTPATIENT)
Dept: CT IMAGING | Facility: HOSPITAL | Age: 34
End: 2019-07-29

## 2019-07-29 ENCOUNTER — HOSPITAL ENCOUNTER (INPATIENT)
Facility: HOSPITAL | Age: 34
LOS: 2 days | Discharge: HOME | End: 2019-07-31
Attending: EMERGENCY MEDICINE | Admitting: INTERNAL MEDICINE

## 2019-07-29 DIAGNOSIS — N12 PYELONEPHRITIS: Primary | ICD-10-CM

## 2019-07-29 LAB
ALBUMIN SERPL-MCNC: 4.7 G/DL (ref 3.5–5)
ALBUMIN/GLOB SERPL: 1.4 G/DL (ref 1.1–2.5)
ALP SERPL-CCNC: 73 U/L (ref 24–120)
ALT SERPL W P-5'-P-CCNC: 29 U/L (ref 0–54)
ANION GAP SERPL CALCULATED.3IONS-SCNC: 10 MMOL/L (ref 4–13)
AST SERPL-CCNC: 39 U/L (ref 7–45)
BACTERIA UR QL AUTO: ABNORMAL /HPF
BASOPHILS # BLD AUTO: 0.04 10*3/MM3 (ref 0–0.2)
BASOPHILS NFR BLD AUTO: 0.6 % (ref 0–2)
BILIRUB SERPL-MCNC: 0.9 MG/DL (ref 0.1–1)
BILIRUB UR QL STRIP: NEGATIVE
BUN BLD-MCNC: 11 MG/DL (ref 5–21)
BUN/CREAT SERPL: 16.2 (ref 7–25)
CALCIUM SPEC-SCNC: 10.1 MG/DL (ref 8.4–10.4)
CHLORIDE SERPL-SCNC: 104 MMOL/L (ref 98–110)
CLARITY UR: ABNORMAL
CO2 SERPL-SCNC: 26 MMOL/L (ref 24–31)
COLOR UR: YELLOW
CREAT BLD-MCNC: 0.68 MG/DL (ref 0.5–1.4)
DEPRECATED RDW RBC AUTO: 42.9 FL (ref 40–54)
EOSINOPHIL # BLD AUTO: 0.08 10*3/MM3 (ref 0–0.7)
EOSINOPHIL NFR BLD AUTO: 1.1 % (ref 0–4)
ERYTHROCYTE [DISTWIDTH] IN BLOOD BY AUTOMATED COUNT: 13.6 % (ref 12–15)
GFR SERPL CREATININE-BSD FRML MDRD: 98 ML/MIN/1.73
GLOBULIN UR ELPH-MCNC: 3.3 GM/DL
GLUCOSE BLD-MCNC: 108 MG/DL (ref 70–100)
GLUCOSE UR STRIP-MCNC: NEGATIVE MG/DL
HCG SERPL QL: NEGATIVE
HCT VFR BLD AUTO: 41.7 % (ref 37–47)
HGB BLD-MCNC: 13.7 G/DL (ref 12–16)
HGB UR QL STRIP.AUTO: ABNORMAL
HOLD SPECIMEN: NORMAL
HYALINE CASTS UR QL AUTO: ABNORMAL /LPF
IMM GRANULOCYTES # BLD AUTO: 0.02 10*3/MM3 (ref 0–0.05)
IMM GRANULOCYTES NFR BLD AUTO: 0.3 % (ref 0–5)
KETONES UR QL STRIP: NEGATIVE
LEUKOCYTE ESTERASE UR QL STRIP.AUTO: ABNORMAL
LYMPHOCYTES # BLD AUTO: 2.01 10*3/MM3 (ref 0.72–4.86)
LYMPHOCYTES NFR BLD AUTO: 28.1 % (ref 15–45)
MCH RBC QN AUTO: 28.6 PG (ref 28–32)
MCHC RBC AUTO-ENTMCNC: 32.9 G/DL (ref 33–36)
MCV RBC AUTO: 87.1 FL (ref 82–98)
MONOCYTES # BLD AUTO: 0.44 10*3/MM3 (ref 0.19–1.3)
MONOCYTES NFR BLD AUTO: 6.1 % (ref 4–12)
NEUTROPHILS # BLD AUTO: 4.57 10*3/MM3 (ref 1.87–8.4)
NEUTROPHILS NFR BLD AUTO: 63.8 % (ref 39–78)
NITRITE UR QL STRIP: POSITIVE
NRBC BLD AUTO-RTO: 0 /100 WBC (ref 0–0.2)
PH UR STRIP.AUTO: 5.5 [PH] (ref 5–8)
PLATELET # BLD AUTO: 289 10*3/MM3 (ref 130–400)
PMV BLD AUTO: 11 FL (ref 6–12)
POTASSIUM BLD-SCNC: 4.3 MMOL/L (ref 3.5–5.3)
PROT SERPL-MCNC: 8 G/DL (ref 6.3–8.7)
PROT UR QL STRIP: NEGATIVE
RBC # BLD AUTO: 4.79 10*6/MM3 (ref 4.2–5.4)
RBC # UR: ABNORMAL /HPF
REF LAB TEST METHOD: ABNORMAL
SODIUM BLD-SCNC: 140 MMOL/L (ref 135–145)
SP GR UR STRIP: 1 (ref 1–1.03)
SQUAMOUS #/AREA URNS HPF: ABNORMAL /HPF
UROBILINOGEN UR QL STRIP: ABNORMAL
WBC NRBC COR # BLD: 7.16 10*3/MM3 (ref 4.8–10.8)
WBC UR QL AUTO: ABNORMAL /HPF
WHOLE BLOOD HOLD SPECIMEN: NORMAL
WHOLE BLOOD HOLD SPECIMEN: NORMAL

## 2019-07-29 PROCEDURE — 80053 COMPREHEN METABOLIC PANEL: CPT | Performed by: EMERGENCY MEDICINE

## 2019-07-29 PROCEDURE — 25010000002 IOPAMIDOL 61 % SOLUTION: Performed by: EMERGENCY MEDICINE

## 2019-07-29 PROCEDURE — 87040 BLOOD CULTURE FOR BACTERIA: CPT | Performed by: EMERGENCY MEDICINE

## 2019-07-29 PROCEDURE — 25010000002 PIPERACILLIN SOD-TAZOBACTAM PER 1 G: Performed by: NURSE PRACTITIONER

## 2019-07-29 PROCEDURE — G0378 HOSPITAL OBSERVATION PER HR: HCPCS

## 2019-07-29 PROCEDURE — 94799 UNLISTED PULMONARY SVC/PX: CPT

## 2019-07-29 PROCEDURE — 85025 COMPLETE CBC W/AUTO DIFF WBC: CPT | Performed by: EMERGENCY MEDICINE

## 2019-07-29 PROCEDURE — 87086 URINE CULTURE/COLONY COUNT: CPT | Performed by: EMERGENCY MEDICINE

## 2019-07-29 PROCEDURE — 99284 EMERGENCY DEPT VISIT MOD MDM: CPT

## 2019-07-29 PROCEDURE — 25010000002 CEFTRIAXONE PER 250 MG: Performed by: EMERGENCY MEDICINE

## 2019-07-29 PROCEDURE — 87077 CULTURE AEROBIC IDENTIFY: CPT | Performed by: EMERGENCY MEDICINE

## 2019-07-29 PROCEDURE — 94760 N-INVAS EAR/PLS OXIMETRY 1: CPT

## 2019-07-29 PROCEDURE — 84703 CHORIONIC GONADOTROPIN ASSAY: CPT | Performed by: EMERGENCY MEDICINE

## 2019-07-29 PROCEDURE — 74177 CT ABD & PELVIS W/CONTRAST: CPT

## 2019-07-29 PROCEDURE — 25010000002 ONDANSETRON PER 1 MG: Performed by: EMERGENCY MEDICINE

## 2019-07-29 PROCEDURE — 81001 URINALYSIS AUTO W/SCOPE: CPT | Performed by: EMERGENCY MEDICINE

## 2019-07-29 PROCEDURE — 87186 SC STD MICRODIL/AGAR DIL: CPT | Performed by: EMERGENCY MEDICINE

## 2019-07-29 PROCEDURE — 25010000002 PROMETHAZINE PER 50 MG: Performed by: NURSE PRACTITIONER

## 2019-07-29 RX ORDER — ACETAMINOPHEN 325 MG/1
650 TABLET ORAL EVERY 4 HOURS PRN
Status: DISCONTINUED | OUTPATIENT
Start: 2019-07-29 | End: 2019-07-31 | Stop reason: HOSPADM

## 2019-07-29 RX ORDER — PROMETHAZINE HYDROCHLORIDE 25 MG/ML
12.5 INJECTION, SOLUTION INTRAMUSCULAR; INTRAVENOUS EVERY 6 HOURS PRN
Status: DISCONTINUED | OUTPATIENT
Start: 2019-07-29 | End: 2019-07-31 | Stop reason: HOSPADM

## 2019-07-29 RX ORDER — ONDANSETRON 2 MG/ML
4 INJECTION INTRAMUSCULAR; INTRAVENOUS ONCE
Status: COMPLETED | OUTPATIENT
Start: 2019-07-29 | End: 2019-07-29

## 2019-07-29 RX ORDER — SODIUM CHLORIDE 9 MG/ML
75 INJECTION, SOLUTION INTRAVENOUS CONTINUOUS
Status: DISCONTINUED | OUTPATIENT
Start: 2019-07-29 | End: 2019-07-31 | Stop reason: HOSPADM

## 2019-07-29 RX ORDER — PROMETHAZINE HYDROCHLORIDE 25 MG/1
12.5 TABLET ORAL EVERY 6 HOURS PRN
Status: DISCONTINUED | OUTPATIENT
Start: 2019-07-29 | End: 2019-07-31 | Stop reason: HOSPADM

## 2019-07-29 RX ORDER — PROMETHAZINE HYDROCHLORIDE 12.5 MG/1
12.5 SUPPOSITORY RECTAL EVERY 6 HOURS PRN
Status: DISCONTINUED | OUTPATIENT
Start: 2019-07-29 | End: 2019-07-31 | Stop reason: HOSPADM

## 2019-07-29 RX ORDER — CIPROFLOXACIN 500 MG/1
500 TABLET, FILM COATED ORAL 2 TIMES DAILY
Status: ON HOLD | COMMUNITY
End: 2019-07-29

## 2019-07-29 RX ORDER — SODIUM CHLORIDE 0.9 % (FLUSH) 0.9 %
3 SYRINGE (ML) INJECTION EVERY 12 HOURS SCHEDULED
Status: DISCONTINUED | OUTPATIENT
Start: 2019-07-29 | End: 2019-07-31 | Stop reason: HOSPADM

## 2019-07-29 RX ORDER — SODIUM CHLORIDE 0.9 % (FLUSH) 0.9 %
3-10 SYRINGE (ML) INJECTION AS NEEDED
Status: DISCONTINUED | OUTPATIENT
Start: 2019-07-29 | End: 2019-07-31 | Stop reason: HOSPADM

## 2019-07-29 RX ADMIN — TAZOBACTAM SODIUM AND PIPERACILLIN SODIUM 3.38 G: 375; 3 INJECTION, SOLUTION INTRAVENOUS at 12:05

## 2019-07-29 RX ADMIN — TAZOBACTAM SODIUM AND PIPERACILLIN SODIUM 3.38 G: 375; 3 INJECTION, SOLUTION INTRAVENOUS at 17:53

## 2019-07-29 RX ADMIN — PROMETHAZINE HYDROCHLORIDE 12.5 MG: 25 INJECTION INTRAMUSCULAR; INTRAVENOUS at 11:25

## 2019-07-29 RX ADMIN — ONDANSETRON 4 MG: 2 INJECTION INTRAMUSCULAR; INTRAVENOUS at 07:50

## 2019-07-29 RX ADMIN — IOPAMIDOL 100 ML: 612 INJECTION, SOLUTION INTRAVENOUS at 08:01

## 2019-07-29 RX ADMIN — CEFTRIAXONE SODIUM 1 G: 1 INJECTION, POWDER, FOR SOLUTION INTRAMUSCULAR; INTRAVENOUS at 09:11

## 2019-07-29 RX ADMIN — HYDROMORPHONE HYDROCHLORIDE 1 MG: 1 INJECTION, SOLUTION INTRAMUSCULAR; INTRAVENOUS; SUBCUTANEOUS at 07:50

## 2019-07-29 RX ADMIN — ACETAMINOPHEN 650 MG: 325 TABLET, FILM COATED ORAL at 22:07

## 2019-07-29 RX ADMIN — SODIUM CHLORIDE 75 ML/HR: 9 INJECTION, SOLUTION INTRAVENOUS at 11:03

## 2019-07-30 LAB
ANION GAP SERPL CALCULATED.3IONS-SCNC: 4 MMOL/L (ref 4–13)
BASOPHILS # BLD AUTO: 0.02 10*3/MM3 (ref 0–0.2)
BASOPHILS NFR BLD AUTO: 0.3 % (ref 0–2)
BUN BLD-MCNC: 12 MG/DL (ref 5–21)
BUN/CREAT SERPL: 14.3 (ref 7–25)
CALCIUM SPEC-SCNC: 9.5 MG/DL (ref 8.4–10.4)
CHLORIDE SERPL-SCNC: 107 MMOL/L (ref 98–110)
CO2 SERPL-SCNC: 28 MMOL/L (ref 24–31)
CREAT BLD-MCNC: 0.84 MG/DL (ref 0.5–1.4)
DEPRECATED RDW RBC AUTO: 45 FL (ref 40–54)
EOSINOPHIL # BLD AUTO: 0.13 10*3/MM3 (ref 0–0.7)
EOSINOPHIL NFR BLD AUTO: 1.9 % (ref 0–4)
ERYTHROCYTE [DISTWIDTH] IN BLOOD BY AUTOMATED COUNT: 13.9 % (ref 12–15)
GFR SERPL CREATININE-BSD FRML MDRD: 77 ML/MIN/1.73
GLUCOSE BLD-MCNC: 98 MG/DL (ref 70–100)
HCT VFR BLD AUTO: 38.3 % (ref 37–47)
HGB BLD-MCNC: 12.4 G/DL (ref 12–16)
IMM GRANULOCYTES # BLD AUTO: 0.02 10*3/MM3 (ref 0–0.05)
IMM GRANULOCYTES NFR BLD AUTO: 0.3 % (ref 0–5)
LYMPHOCYTES # BLD AUTO: 2.18 10*3/MM3 (ref 0.72–4.86)
LYMPHOCYTES NFR BLD AUTO: 31.6 % (ref 15–45)
MCH RBC QN AUTO: 28.9 PG (ref 28–32)
MCHC RBC AUTO-ENTMCNC: 32.4 G/DL (ref 33–36)
MCV RBC AUTO: 89.3 FL (ref 82–98)
MONOCYTES # BLD AUTO: 0.54 10*3/MM3 (ref 0.19–1.3)
MONOCYTES NFR BLD AUTO: 7.8 % (ref 4–12)
NEUTROPHILS # BLD AUTO: 4.01 10*3/MM3 (ref 1.87–8.4)
NEUTROPHILS NFR BLD AUTO: 58.1 % (ref 39–78)
NRBC BLD AUTO-RTO: 0 /100 WBC (ref 0–0.2)
PLATELET # BLD AUTO: 248 10*3/MM3 (ref 130–400)
PMV BLD AUTO: 11.5 FL (ref 6–12)
POTASSIUM BLD-SCNC: 4.1 MMOL/L (ref 3.5–5.3)
RBC # BLD AUTO: 4.29 10*6/MM3 (ref 4.2–5.4)
SODIUM BLD-SCNC: 139 MMOL/L (ref 135–145)
WBC NRBC COR # BLD: 6.9 10*3/MM3 (ref 4.8–10.8)

## 2019-07-30 PROCEDURE — 99243 OFF/OP CNSLTJ NEW/EST LOW 30: CPT | Performed by: UROLOGY

## 2019-07-30 PROCEDURE — 94799 UNLISTED PULMONARY SVC/PX: CPT

## 2019-07-30 PROCEDURE — G0378 HOSPITAL OBSERVATION PER HR: HCPCS

## 2019-07-30 PROCEDURE — 85025 COMPLETE CBC W/AUTO DIFF WBC: CPT | Performed by: NURSE PRACTITIONER

## 2019-07-30 PROCEDURE — 25010000002 PIPERACILLIN SOD-TAZOBACTAM PER 1 G: Performed by: NURSE PRACTITIONER

## 2019-07-30 PROCEDURE — 25010000002 PROMETHAZINE PER 50 MG: Performed by: NURSE PRACTITIONER

## 2019-07-30 PROCEDURE — 94760 N-INVAS EAR/PLS OXIMETRY 1: CPT

## 2019-07-30 PROCEDURE — 80048 BASIC METABOLIC PNL TOTAL CA: CPT | Performed by: NURSE PRACTITIONER

## 2019-07-30 RX ORDER — TRAMADOL HYDROCHLORIDE 50 MG/1
50 TABLET ORAL EVERY 6 HOURS PRN
Status: DISCONTINUED | OUTPATIENT
Start: 2019-07-30 | End: 2019-07-31 | Stop reason: HOSPADM

## 2019-07-30 RX ADMIN — SODIUM CHLORIDE 75 ML/HR: 9 INJECTION, SOLUTION INTRAVENOUS at 00:13

## 2019-07-30 RX ADMIN — TAZOBACTAM SODIUM AND PIPERACILLIN SODIUM 3.38 G: 375; 3 INJECTION, SOLUTION INTRAVENOUS at 17:40

## 2019-07-30 RX ADMIN — TAZOBACTAM SODIUM AND PIPERACILLIN SODIUM 3.38 G: 375; 3 INJECTION, SOLUTION INTRAVENOUS at 02:27

## 2019-07-30 RX ADMIN — TRAMADOL HYDROCHLORIDE 50 MG: 50 TABLET ORAL at 14:28

## 2019-07-30 RX ADMIN — SODIUM CHLORIDE 75 ML/HR: 9 INJECTION, SOLUTION INTRAVENOUS at 14:02

## 2019-07-30 RX ADMIN — PROMETHAZINE HYDROCHLORIDE 12.5 MG: 25 INJECTION INTRAMUSCULAR; INTRAVENOUS at 18:12

## 2019-07-30 RX ADMIN — PROMETHAZINE HYDROCHLORIDE 12.5 MG: 25 INJECTION INTRAMUSCULAR; INTRAVENOUS at 00:10

## 2019-07-30 RX ADMIN — TAZOBACTAM SODIUM AND PIPERACILLIN SODIUM 3.38 G: 375; 3 INJECTION, SOLUTION INTRAVENOUS at 10:31

## 2019-07-30 RX ADMIN — TRAMADOL HYDROCHLORIDE 50 MG: 50 TABLET ORAL at 20:36

## 2019-07-31 VITALS
DIASTOLIC BLOOD PRESSURE: 51 MMHG | RESPIRATION RATE: 18 BRPM | WEIGHT: 216.5 LBS | SYSTOLIC BLOOD PRESSURE: 110 MMHG | BODY MASS INDEX: 38.36 KG/M2 | HEART RATE: 52 BPM | OXYGEN SATURATION: 98 % | HEIGHT: 63 IN | TEMPERATURE: 97.8 F

## 2019-07-31 LAB
ANION GAP SERPL CALCULATED.3IONS-SCNC: 4 MMOL/L (ref 4–13)
BACTERIA SPEC AEROBE CULT: ABNORMAL
BASOPHILS # BLD AUTO: 0.03 10*3/MM3 (ref 0–0.2)
BASOPHILS NFR BLD AUTO: 0.4 % (ref 0–2)
BUN BLD-MCNC: 7 MG/DL (ref 5–21)
BUN/CREAT SERPL: 10.1 (ref 7–25)
CALCIUM SPEC-SCNC: 9.3 MG/DL (ref 8.4–10.4)
CHLORIDE SERPL-SCNC: 107 MMOL/L (ref 98–110)
CO2 SERPL-SCNC: 28 MMOL/L (ref 24–31)
CREAT BLD-MCNC: 0.69 MG/DL (ref 0.5–1.4)
DEPRECATED RDW RBC AUTO: 44.7 FL (ref 40–54)
EOSINOPHIL # BLD AUTO: 0.14 10*3/MM3 (ref 0–0.7)
EOSINOPHIL NFR BLD AUTO: 2 % (ref 0–4)
ERYTHROCYTE [DISTWIDTH] IN BLOOD BY AUTOMATED COUNT: 13.9 % (ref 12–15)
GFR SERPL CREATININE-BSD FRML MDRD: 97 ML/MIN/1.73
GLUCOSE BLD-MCNC: 93 MG/DL (ref 70–100)
HCT VFR BLD AUTO: 37.9 % (ref 37–47)
HGB BLD-MCNC: 12.4 G/DL (ref 12–16)
IMM GRANULOCYTES # BLD AUTO: 0.02 10*3/MM3 (ref 0–0.05)
IMM GRANULOCYTES NFR BLD AUTO: 0.3 % (ref 0–5)
LYMPHOCYTES # BLD AUTO: 2.76 10*3/MM3 (ref 0.72–4.86)
LYMPHOCYTES NFR BLD AUTO: 40 % (ref 15–45)
MCH RBC QN AUTO: 29 PG (ref 28–32)
MCHC RBC AUTO-ENTMCNC: 32.7 G/DL (ref 33–36)
MCV RBC AUTO: 88.6 FL (ref 82–98)
MONOCYTES # BLD AUTO: 0.42 10*3/MM3 (ref 0.19–1.3)
MONOCYTES NFR BLD AUTO: 6.1 % (ref 4–12)
NEUTROPHILS # BLD AUTO: 3.53 10*3/MM3 (ref 1.87–8.4)
NEUTROPHILS NFR BLD AUTO: 51.2 % (ref 39–78)
NRBC BLD AUTO-RTO: 0 /100 WBC (ref 0–0.2)
PLATELET # BLD AUTO: 236 10*3/MM3 (ref 130–400)
PMV BLD AUTO: 11.1 FL (ref 6–12)
POTASSIUM BLD-SCNC: 4.2 MMOL/L (ref 3.5–5.3)
RBC # BLD AUTO: 4.28 10*6/MM3 (ref 4.2–5.4)
SODIUM BLD-SCNC: 139 MMOL/L (ref 135–145)
WBC NRBC COR # BLD: 6.9 10*3/MM3 (ref 4.8–10.8)

## 2019-07-31 PROCEDURE — 25010000002 PIPERACILLIN SOD-TAZOBACTAM PER 1 G: Performed by: NURSE PRACTITIONER

## 2019-07-31 PROCEDURE — 80048 BASIC METABOLIC PNL TOTAL CA: CPT | Performed by: NURSE PRACTITIONER

## 2019-07-31 PROCEDURE — G0378 HOSPITAL OBSERVATION PER HR: HCPCS

## 2019-07-31 PROCEDURE — 85025 COMPLETE CBC W/AUTO DIFF WBC: CPT | Performed by: NURSE PRACTITIONER

## 2019-07-31 RX ORDER — ONDANSETRON 4 MG/1
4 TABLET, ORALLY DISINTEGRATING ORAL EVERY 8 HOURS PRN
Qty: 30 TABLET | Refills: 0 | Status: SHIPPED | OUTPATIENT
Start: 2019-07-31 | End: 2020-10-02

## 2019-07-31 RX ORDER — CEPHALEXIN 250 MG/1
250 CAPSULE ORAL 4 TIMES DAILY
Qty: 28 CAPSULE | Refills: 0 | Status: SHIPPED | OUTPATIENT
Start: 2019-07-31 | End: 2019-08-07

## 2019-07-31 RX ADMIN — TAZOBACTAM SODIUM AND PIPERACILLIN SODIUM 3.38 G: 375; 3 INJECTION, SOLUTION INTRAVENOUS at 02:28

## 2019-07-31 RX ADMIN — SODIUM CHLORIDE, PRESERVATIVE FREE 3 ML: 5 INJECTION INTRAVENOUS at 07:59

## 2019-07-31 RX ADMIN — SODIUM CHLORIDE 75 ML/HR: 9 INJECTION, SOLUTION INTRAVENOUS at 07:59

## 2019-08-01 NOTE — PAYOR COMM NOTE
"[facility] [facility] 7-31-19  [id number]    Gustavo Levine (34 y.o. Female)     Date of Birth Social Security Number Address Home Phone MRN    1985  [id number]  [place] [address] [phone] [id number]    Mu-ism Marital Status          Other        Admission Date Admission Type Admitting Provider Attending Provider Department, Room/Bed    7/29/19 Emergency Leo Kelly MD  [facility] 4C, 471/1    Discharge Date Discharge Disposition Discharge Destination        7/31/2019 Home or Self Care Home             Attending Provider:  (none)   Allergies:  Sulfa Antibiotics    Isolation:  None   Infection:  None   Code Status:  Prior    Ht:  160 cm (63\")   Wt:  98.2 kg (216 lb 8 oz)    Admission Cmt:  None   Principal Problem:  None                Active Insurance as of 7/29/2019     Primary Coverage     Payor Plan Insurance Group Employer/Plan Group    [facility] [employer] [id number]     Payor Plan Address Payor Plan Phone Number Payor Plan Fax Number Effective Dates    [address] [phone]  1/1/2018 - None Entered    [place] [address]       Subscriber Name Subscriber Birth Date Member ID       GUSTAVO LEVINE 1985 [id number]                 Emergency Contacts      (Rel.) Home Phone Work Phone Mobile Phone    Uriel Oneal (Significant Other) [phone] -- --               Discharge Summary      Leo Kelly MD at 7/31/2019  8:50 AM          [facility]  ANJELICA Wheeler M.D. Shauna Yadloski, APRN Meredith Crider, APRN    Internal Medicine Discharge Summary    Patient ID: Gustavo Levine  MRN: [id number]     Acct:  [id number]       Patient's PCP: Kenneth Hopkins MD    Admit Date: 7/29/2019     Discharge Date:   7/31/19    Admitting Physician: Leo Kelly MD    Discharge Physician: EDGARDO Bowen     Active Discharge Diagnoses:  1. Acute " pyelonephritis  2. Anxiety  3. Obesity  4. Intractable nausea and vomiting  5. Left flank pain  6. Chronic left hydropnephrosis      Hospital Problems    Pyelonephritis     Past Medical History:   Diagnosis Date   • PCOS (polycystic ovarian syndrome)    • UTI (urinary tract infection)        The patient was seen and examined on the day of discharge and this discharge summary is in conjunction with any daily progress note from day of discharge.    Code Status:    Code Status and Medical Interventions:   Ordered at: 07/29/19 1051     Level Of Support Discussed With:    Patient     Code Status:    CPR     Medical Interventions (Level of Support Prior to Arrest):    Full       Hospital Course: The patient had been in her usual state of health when she developed dysuria about 3-4 weeks ago. She was seen by a provider and started on Augmentin. She reports that her symptoms improved, but returned as soon as she stopped the antibiotics. She has had recurrent UTIs that have increased in intensity and duration recently. She was seen in our office and found to have evidence of a UTI. SHe was started on Cipro at that time. She reports that she initially had improvement in her symptoms, but yesterday, she developed increased weakness with left flank pain and intractable nausea and vomiting. When her symptoms didn't improve, she presented to ER with her complaints. She was found to have evidence of UTI with positive nitrite and large leukocyte esterase. WBC is normal. CT abdomen and pelvis largely within normal limits. She was admitted to our service for further evaluation and treatment.   The patient was hydrated with IV fluids and placed on IV Zosyn. She had improvement in her dysuria and suprapubic pain, but had increased left flank pain. She has a remote history of UPJ obstruction and repair and resulting chronic left hydronephrosis and renal atrophy. She was seen in consultation by urology and found to have no indication for  intervention. She has responded well to treatment and is tolerating po diet. Urine culture positive for e. Coli. She will be transitioned to oral antibiotics and discharged to home with close outpatient follow up.         Consults:  Dr. Hollins (urology)    Disposition: home    Discharged Condition: Stable    Follow Up: Kenneth Hopkins MD  [address]  [address]  [place] [address]  [phone]    Follow up      Leo Kelly MD  [address] [place] [address]  [phone]    Follow up in 1 week(s)  hospital follow up      Diet: Diet Full Liquid    Discharge Medications:      Discharge Medications      New Medications      Instructions Start Date   cephalexin 250 MG capsule  Commonly known as:  KEFLEX   250 mg, Oral, 4 Times Daily      ondansetron ODT 4 MG disintegrating tablet  Commonly known as:  ZOFRAN ODT   4 mg, Oral, Every 8 Hours PRN             Time Spent on discharge is  32 minutes in patient examination, evaluation, patient/family counseling as well as medication reconciliation, prescriptions for required medications, discharge plan and follow up.     Electronically signed by EDGARDO Bowen on 7/31/2019 at 8:50 AM     I have discussed the care of Carmen Gonzalez, including pertinent history and exam findings, with the nurse practitioner.    I have seen and examined the patient and the key elements of all parts of the encounter have been performed by me.  I agree with the assessment, plan and orders as documented by EDGARDO Naranjo, after I modified the exam findings and the plan of treatments and the final version is my approved version of the assessment.        Electronically signed by Leo Kelly MD on 7/31/2019 at 10:55 AM      Electronically signed by Leo Kelly MD at 7/31/2019 10:55 AM

## 2019-08-03 LAB
BACTERIA SPEC AEROBE CULT: NORMAL
BACTERIA SPEC AEROBE CULT: NORMAL

## 2019-08-07 DIAGNOSIS — B00.9 HSV-2 (HERPES SIMPLEX VIRUS 2) INFECTION: Primary | ICD-10-CM

## 2019-08-08 RX ORDER — VALACYCLOVIR HYDROCHLORIDE 500 MG/1
TABLET, FILM COATED ORAL
Qty: 30 TABLET | Refills: 4 | Status: SHIPPED | OUTPATIENT
Start: 2019-08-08 | End: 2020-11-25 | Stop reason: SDUPTHER

## 2019-09-13 ENCOUNTER — OFFICE VISIT (OUTPATIENT)
Dept: OBSTETRICS AND GYNECOLOGY | Facility: CLINIC | Age: 34
End: 2019-09-13

## 2019-09-13 VITALS
BODY MASS INDEX: 37.92 KG/M2 | DIASTOLIC BLOOD PRESSURE: 78 MMHG | WEIGHT: 214 LBS | HEIGHT: 63 IN | SYSTOLIC BLOOD PRESSURE: 136 MMHG

## 2019-09-13 DIAGNOSIS — F32.9 REACTIVE DEPRESSION: Primary | ICD-10-CM

## 2019-09-13 DIAGNOSIS — F41.9 ANXIETY: ICD-10-CM

## 2019-09-13 PROCEDURE — 99213 OFFICE O/P EST LOW 20 MIN: CPT | Performed by: NURSE PRACTITIONER

## 2019-09-13 RX ORDER — ALPRAZOLAM 0.25 MG/1
0.25 TABLET ORAL 3 TIMES DAILY PRN
Qty: 90 TABLET | Refills: 0 | Status: SHIPPED | OUTPATIENT
Start: 2019-09-13 | End: 2020-09-09

## 2019-09-13 RX ORDER — CITALOPRAM 20 MG/1
20 TABLET ORAL DAILY
Qty: 30 TABLET | Refills: 12 | Status: SHIPPED | OUTPATIENT
Start: 2019-09-13 | End: 2020-10-02

## 2019-09-13 NOTE — PROGRESS NOTES
Attempted to obtain health maintenance information, patient unable to provide answers for the following items Mammogram, Colonoscopy, Pneumococcal Vaccine, Medicare Annual Wellness Exam, Lipid Panel, Diabetic Eye Exam, Diabetic Foot Exam, HPV Vaccine, Chlamydia Screening  and Zoster Vaccine.

## 2019-09-13 NOTE — PROGRESS NOTES
"Subjective     Carmen Gonzalez is a 34 y.o. female    Patient comes in today for complaint of anxiety and depression.  She states that anxiety can be so severe that she feels like something is sitting on her chest.  She declines an EKG.  She is recently gone through a divorce and her ex- is giving her hard time with her kids.      Anxiety   Presents for initial visit. Onset was 1 to 6 months ago. The problem has been rapidly worsening. Symptoms include depressed mood, excessive worry, insomnia, muscle tension and nervous/anxious behavior. Patient reports no chest pain, compulsions, confusion, decreased concentration, dizziness, dry mouth, feeling of choking, hyperventilation, irritability, malaise, nausea, obsessions, palpitations, panic, restlessness, shortness of breath or suicidal ideas. Symptoms occur most days. The severity of symptoms is moderate. The symptoms are aggravated by family issues. The quality of sleep is fair. Nighttime awakenings: occasional.             /78   Ht 160 cm (63\")   Wt 97.1 kg (214 lb)   LMP 2019 (Exact Date) Comment: TL  Breastfeeding? No   BMI 37.91 kg/m²     Outpatient Encounter Medications as of 2019   Medication Sig Dispense Refill   • valACYclovir (VALTREX) 500 MG tablet Take 1 tablet by mouth twice daily for 3 days 30 tablet 4   • ALPRAZolam (XANAX) 0.25 MG tablet Take 1 tablet by mouth 3 (Three) Times a Day As Needed for Anxiety. 90 tablet 0   • citalopram (CELEXA) 20 MG tablet Take 1 tablet by mouth Daily. 30 tablet 12   • ondansetron ODT (ZOFRAN ODT) 4 MG disintegrating tablet Take 1 tablet by mouth Every 8 (Eight) Hours As Needed for Nausea or Vomiting. 30 tablet 0     No facility-administered encounter medications on file as of 2019.        Surgical History  Past Surgical History:   Procedure Laterality Date   • ABDOMINAL SURGERY      transabdominal cerclage   • CERVICAL BIOPSY  W/ LOOP ELECTRODE EXCISION     •  SECTION     • " KIDNEY SURGERY     • TUBAL ABDOMINAL LIGATION         Family History  Family History   Problem Relation Age of Onset   • Stroke Father    • Endometrial cancer Mother    • No Known Problems Brother    • Breast cancer Neg Hx    • Ovarian cancer Neg Hx    • Colon cancer Neg Hx        The following portions of the patient's history were reviewed and updated as appropriate: allergies, current medications, past family history, past medical history, past social history, past surgical history and problem list.    Review of Systems   Constitutional: Negative for activity change, appetite change, chills, diaphoresis, fatigue, fever, irritability, unexpected weight gain and unexpected weight loss.   HENT: Negative for congestion, dental problem, drooling, ear discharge, ear pain, facial swelling, hearing loss, mouth sores, nosebleeds, postnasal drip, rhinorrhea, sinus pressure, sneezing, sore throat, swollen glands, tinnitus, trouble swallowing and voice change.    Eyes: Negative for blurred vision, double vision, photophobia, pain, discharge, redness, itching and visual disturbance.   Respiratory: Negative for apnea, cough, choking, chest tightness, shortness of breath, wheezing and stridor.    Cardiovascular: Negative for chest pain, palpitations and leg swelling.   Gastrointestinal: Negative for abdominal distention, abdominal pain, anal bleeding, blood in stool, constipation, diarrhea, nausea, rectal pain, vomiting, GERD and indigestion.   Endocrine: Negative for cold intolerance, heat intolerance, polydipsia, polyphagia and polyuria.   Genitourinary: Negative for amenorrhea, breast discharge, breast lump, breast pain, decreased libido, decreased urine volume, difficulty urinating, dyspareunia, dysuria, flank pain, frequency, genital sores, hematuria, menstrual problem, pelvic pain, pelvic pressure, urgency, urinary incontinence, vaginal bleeding, vaginal discharge and vaginal pain.   Musculoskeletal: Negative for  arthralgias, back pain, gait problem, joint swelling, myalgias, neck pain, neck stiffness and bursitis.   Skin: Negative for color change, dry skin and rash.   Allergic/Immunologic: Negative for environmental allergies, food allergies and immunocompromised state.   Neurological: Negative for dizziness, tremors, seizures, syncope, facial asymmetry, speech difficulty, weakness, light-headedness, numbness, headache, memory problem and confusion.   Hematological: Negative for adenopathy. Does not bruise/bleed easily.   Psychiatric/Behavioral: Positive for sleep disturbance and depressed mood. Negative for agitation, behavioral problems, decreased concentration, dysphoric mood, hallucinations, self-injury, suicidal ideas, negative for hyperactivity and stress. The patient is nervous/anxious and has insomnia.        Objective   Physical Exam   Constitutional: She is oriented to person, place, and time. She appears well-developed and well-nourished.   HENT:   Head: Normocephalic and atraumatic.   Eyes: Conjunctivae are normal. Right eye exhibits no discharge. Left eye exhibits no discharge.   Neck: Normal range of motion. Neck supple. No thyromegaly present.   Cardiovascular: Normal rate, regular rhythm and normal heart sounds.   Pulmonary/Chest: Effort normal and breath sounds normal.   Neurological: She is alert and oriented to person, place, and time.   Skin: Skin is warm and dry.   Psychiatric: She has a normal mood and affect. Her behavior is normal. Judgment and thought content normal.   Nursing note and vitals reviewed.      Assessment/Plan   Carmen was seen today for anxiety.    Diagnoses and all orders for this visit:    Reactive depression  Comments:  Patient is having some depression and anxiety due to her recent divorce.  Her ex- is giving her a lot of problems with her kids.  Is crying today.  She declined seeing a counselor she is done in the past.  she denies any thoughts of suicide.  Will try Celexa  she has tried Lexapro in the past with no real improvement.  Orders:  -     citalopram (CELEXA) 20 MG tablet; Take 1 tablet by mouth Daily.    Anxiety  Comments:  Patient states that her anxiety is so bad she feels like something sitting on her chest.  She denies any pain down her arm or in her jaw.  States that the pain in her chest is directly related to her anxiety.  We will try Xanax.  Lan is obtained.  RTO in 1 month for yearly checkup.  Orders:  -     ALPRAZolam (XANAX) 0.25 MG tablet; Take 1 tablet by mouth 3 (Three) Times a Day As Needed for Anxiety.         Patient's Body mass index is 37.91 kg/m². BMI is above normal parameters. Recommendations include: educational material, exercise counseling and nutrition counseling.      Sunita Sommers, APRN  9/13/2019

## 2019-09-13 NOTE — PATIENT INSTRUCTIONS

## 2020-04-01 LAB
C TRACH RRNA CVX QL NAA+PROBE: DETECTED
N GONORRHOEA RRNA SPEC QL NAA+PROBE: NOT DETECTED

## 2020-06-29 ENCOUNTER — TRANSCRIBE ORDERS (OUTPATIENT)
Dept: ADMINISTRATIVE | Facility: HOSPITAL | Age: 35
End: 2020-06-29

## 2020-06-29 ENCOUNTER — LAB (OUTPATIENT)
Dept: LAB | Facility: HOSPITAL | Age: 35
End: 2020-06-29

## 2020-06-29 DIAGNOSIS — Z01.818 PRE-OP TESTING: Primary | ICD-10-CM

## 2020-06-29 LAB — SARS-COV-2 ORF1AB RESP QL NAA+PROBE: NOT DETECTED

## 2020-06-29 PROCEDURE — U0004 COV-19 TEST NON-CDC HGH THRU: HCPCS | Performed by: OBSTETRICS & GYNECOLOGY

## 2020-09-09 ENCOUNTER — OFFICE VISIT (OUTPATIENT)
Dept: OBSTETRICS AND GYNECOLOGY | Facility: CLINIC | Age: 35
End: 2020-09-09

## 2020-09-09 VITALS
DIASTOLIC BLOOD PRESSURE: 82 MMHG | HEIGHT: 63 IN | SYSTOLIC BLOOD PRESSURE: 132 MMHG | BODY MASS INDEX: 37.39 KG/M2 | WEIGHT: 211 LBS

## 2020-09-09 DIAGNOSIS — F41.9 ANXIETY: ICD-10-CM

## 2020-09-09 DIAGNOSIS — F32.9 REACTIVE DEPRESSION: Primary | ICD-10-CM

## 2020-09-09 PROCEDURE — 99213 OFFICE O/P EST LOW 20 MIN: CPT | Performed by: NURSE PRACTITIONER

## 2020-09-09 RX ORDER — ALPRAZOLAM 0.5 MG/1
0.5 TABLET ORAL 3 TIMES DAILY PRN
Qty: 90 TABLET | Refills: 0 | Status: SHIPPED | OUTPATIENT
Start: 2020-09-09 | End: 2021-05-10
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
## 2020-09-09 NOTE — PATIENT INSTRUCTIONS

## 2020-10-02 ENCOUNTER — OFFICE VISIT (OUTPATIENT)
Dept: OBSTETRICS AND GYNECOLOGY | Facility: CLINIC | Age: 35
End: 2020-10-02

## 2020-10-02 VITALS
WEIGHT: 215 LBS | DIASTOLIC BLOOD PRESSURE: 76 MMHG | SYSTOLIC BLOOD PRESSURE: 118 MMHG | BODY MASS INDEX: 38.09 KG/M2 | HEIGHT: 63 IN

## 2020-10-02 DIAGNOSIS — R53.83 FATIGUE, UNSPECIFIED TYPE: ICD-10-CM

## 2020-10-02 DIAGNOSIS — E28.2 PCOS (POLYCYSTIC OVARIAN SYNDROME): ICD-10-CM

## 2020-10-02 DIAGNOSIS — Z01.419 WELL WOMAN EXAM WITH ROUTINE GYNECOLOGICAL EXAM: Primary | ICD-10-CM

## 2020-10-02 DIAGNOSIS — Z12.4 CERVICAL CANCER SCREENING: ICD-10-CM

## 2020-10-02 DIAGNOSIS — R30.0 DYSURIA: ICD-10-CM

## 2020-10-02 PROCEDURE — 99213 OFFICE O/P EST LOW 20 MIN: CPT | Performed by: NURSE PRACTITIONER

## 2020-10-02 PROCEDURE — 87624 HPV HI-RISK TYP POOLED RSLT: CPT | Performed by: NURSE PRACTITIONER

## 2020-10-02 PROCEDURE — G0123 SCREEN CERV/VAG THIN LAYER: HCPCS | Performed by: NURSE PRACTITIONER

## 2020-10-02 PROCEDURE — 99395 PREV VISIT EST AGE 18-39: CPT | Performed by: NURSE PRACTITIONER

## 2020-10-02 NOTE — PROGRESS NOTES
Michael Mann is a 35 y.o. female  YOB: 1985        Chief Complaint   Patient presents with   • Gynecologic Exam     Patient here for yearly exam. Last exam was done 09/14/18 and was normal. Patient has c/o of possible UTI today. Patient c/o cloudy urine and burning with urination.        Gynecologic Exam  The patient's pertinent negatives include no pelvic pain. Pertinent negatives include no abdominal pain, back pain, constipation, diarrhea, dysuria, fever, frequency, hematuria, nausea, rash, sore throat, urgency or vomiting.       The following portions of the patient's history were reviewed and updated as appropriate: allergies, current medications, past family history, past medical history, past social history, past surgical history and problem list.    Allergies   Allergen Reactions   • Sulfa Antibiotics Anaphylaxis       Past Medical History:   Diagnosis Date   • Anxiety    • Depression    • PCOS (polycystic ovarian syndrome)    • Polycystic ovary syndrome    • UTI (urinary tract infection)        Family History   Problem Relation Age of Onset   • Stroke Father    • Endometrial cancer Mother    • No Known Problems Brother    • Breast cancer Neg Hx    • Ovarian cancer Neg Hx    • Colon cancer Neg Hx        Social History     Socioeconomic History   • Marital status:      Spouse name: Not on file   • Number of children: Not on file   • Years of education: Not on file   • Highest education level: Not on file   Tobacco Use   • Smoking status: Never Smoker   • Smokeless tobacco: Never Used   Substance and Sexual Activity   • Alcohol use: No   • Drug use: No   • Sexual activity: Yes     Partners: Male     Birth control/protection: Surgical         Current Outpatient Medications:   •  ALPRAZolam (XANAX) 0.5 MG tablet, Take 1 tablet by mouth 3 (Three) Times a Day As Needed for Anxiety., Disp: 90 tablet, Rfl: 0  •  valACYclovir (VALTREX) 500 MG tablet, Take 1 tablet by mouth  twice daily for 3 days, Disp: 30 tablet, Rfl: 4    No LMP recorded.    Sexual History:         Could not be calculated    Past Surgical History:   Procedure Laterality Date   • ABDOMINAL SURGERY      transabdominal cerclage   • CERVICAL BIOPSY  W/ LOOP ELECTRODE EXCISION     •  SECTION     • KIDNEY SURGERY     • TUBAL ABDOMINAL LIGATION         Review of Systems   Constitutional: Negative for activity change, appetite change, fatigue, fever, unexpected weight gain and unexpected weight loss.   HENT: Negative for congestion, ear pain, hearing loss, nosebleeds, rhinorrhea, sore throat, tinnitus and trouble swallowing.    Eyes: Negative for blurred vision, pain, discharge, itching and visual disturbance.   Respiratory: Negative for apnea, chest tightness, shortness of breath and wheezing.    Cardiovascular: Negative for chest pain and leg swelling.   Gastrointestinal: Negative for abdominal pain, blood in stool, constipation, diarrhea, nausea, vomiting and GERD.   Endocrine: Negative for heat intolerance, polydipsia and polyuria.   Genitourinary: Negative for breast lump, decreased libido, difficulty urinating, dyspareunia, dysuria, frequency, genital sores, hematuria, menstrual problem, pelvic pain, urgency, urinary incontinence and vaginal pain.   Musculoskeletal: Negative for arthralgias, back pain, joint swelling and myalgias.   Skin: Negative for color change, rash and skin lesions.   Allergic/Immunologic: Negative for environmental allergies, food allergies and immunocompromised state.   Neurological: Negative for dizziness, tremors, seizures, syncope, facial asymmetry, numbness and headache.   Hematological: Negative for adenopathy. Does not bruise/bleed easily.   Psychiatric/Behavioral: Negative for agitation, hallucinations, sleep disturbance, suicidal ideas and depressed mood. The patient is not nervous/anxious.        Objective   Physical Exam  Vitals signs and nursing note reviewed. Exam  conducted with a chaperone present.   Constitutional:       General: She is not in acute distress.     Appearance: She is well-developed. She is not diaphoretic.   HENT:      Head: Normocephalic.      Right Ear: External ear normal.      Left Ear: External ear normal.      Nose: Nose normal.   Eyes:      General: No scleral icterus.        Right eye: No discharge.         Left eye: No discharge.      Conjunctiva/sclera: Conjunctivae normal.   Neck:      Musculoskeletal: Normal range of motion and neck supple.      Thyroid: No thyromegaly.      Vascular: No carotid bruit.      Trachea: No tracheal deviation.   Cardiovascular:      Rate and Rhythm: Normal rate and regular rhythm.      Heart sounds: Normal heart sounds. No murmur.   Pulmonary:      Effort: Pulmonary effort is normal. No respiratory distress.      Breath sounds: Normal breath sounds. No wheezing.   Chest:      Breasts: Breasts are symmetrical.         Right: No inverted nipple, mass, nipple discharge, skin change or tenderness.         Left: No inverted nipple, mass, nipple discharge, skin change or tenderness.   Abdominal:      General: There is no distension.      Palpations: Abdomen is soft. There is no mass.      Tenderness: There is no abdominal tenderness. There is no guarding.      Hernia: No hernia is present. There is no hernia in the left inguinal area or right inguinal area.   Genitourinary:     General: Normal vulva.      Exam position: Lithotomy position.      Labia:         Right: No rash, tenderness, lesion or injury.         Left: No rash, tenderness, lesion or injury.       Vagina: Normal. No signs of injury and foreign body. No vaginal discharge, erythema, tenderness or bleeding.      Cervix: Normal.      Uterus: Normal. Not enlarged, not fixed and not tender.       Adnexa: Right adnexa normal and left adnexa normal.        Right: No mass, tenderness or fullness.          Left: No mass, tenderness or fullness.        Rectum: Normal. No  "mass.      Comments:   BSU normal  Urethral meatus  Normal  Perineum  Normal  Musculoskeletal: Normal range of motion.         General: No tenderness.   Lymphadenopathy:      Head:      Right side of head: No submental, submandibular, tonsillar, preauricular, posterior auricular or occipital adenopathy.      Left side of head: No submental, submandibular, tonsillar, preauricular, posterior auricular or occipital adenopathy.      Cervical: No cervical adenopathy.      Right cervical: No superficial, deep or posterior cervical adenopathy.     Left cervical: No superficial, deep or posterior cervical adenopathy.      Lower Body: No right inguinal adenopathy. No left inguinal adenopathy.   Skin:     General: Skin is warm and dry.      Findings: No bruising, erythema or rash.   Neurological:      Mental Status: She is alert and oriented to person, place, and time.      Coordination: Coordination normal.   Psychiatric:         Mood and Affect: Mood normal.         Behavior: Behavior normal.         Thought Content: Thought content normal.         Judgment: Judgment normal.           Vitals:    10/02/20 1007   BP: 118/76   Weight: 97.5 kg (215 lb)   Height: 160 cm (63\")       Carmen was seen today for gynecologic exam.    Diagnoses and all orders for this visit:    Well woman exam with routine gynecological exam  Comments:  Well woman exam.  ThinPrep Pap smear done.  Orders:  -     Liquid-based Pap Smear, Screening    Cervical cancer screening  Comments:  ThinPrep Pap smear done.  Orders:  -     Liquid-based Pap Smear, Screening    Dysuria  Comments:  Patient reports dysuria and cloudy urine.  Urine sent for [id number] pending results.  UA in house was negative  Orders:  -     Urine Culture - Urine, Urine, Random Void    Fatigue, unspecified type  Comments:  Yearly lab panel done-follow-up pending results.  Orders:  -     Vitamin D 25 Hydroxy  -     T4, Free  -     Hemoglobin A1c  -     CBC & Differential  -     Lipid " Panel With LDL / HDL Ratio  -     Comprehensive Metabolic Panel  -     TSH    PCOS (polycystic ovarian syndrome)  Comments:  To do fasting PCOS labs [id number] pending results.  Orders:  -     Estradiol  -     Follicle Stimulating Hormone  -     Insulin, Total  -     Luteinizing Hormone  -     Progesterone  -     Testosterone  -     DHEA-Sulfate  -     Cortisol          Patient's Body mass index is 38.09 kg/m². BMI is above normal parameters. Recommendations include: exercise counseling and nutrition counseling.             Non-Smoker    MyChart Instructions Given

## 2020-10-05 LAB
25(OH)D3+25(OH)D2 SERPL-MCNC: 35.5 NG/ML (ref 30–100)
ALBUMIN SERPL-MCNC: 4.7 G/DL (ref 3.5–5.2)
ALBUMIN/GLOB SERPL: 2.4 G/DL
ALP SERPL-CCNC: 81 U/L (ref 39–117)
ALT SERPL-CCNC: 21 U/L (ref 1–33)
AST SERPL-CCNC: 17 U/L (ref 1–32)
BACTERIA UR CULT: ABNORMAL
BACTERIA UR CULT: ABNORMAL
BASOPHILS # BLD AUTO: 0.05 10*3/MM3 (ref 0–0.2)
BASOPHILS NFR BLD AUTO: 0.6 % (ref 0–1.5)
BILIRUB SERPL-MCNC: 0.4 MG/DL (ref 0–1.2)
BUN SERPL-MCNC: 15 MG/DL (ref 6–20)
BUN/CREAT SERPL: 20.5 (ref 7–25)
CALCIUM SERPL-MCNC: 9.6 MG/DL (ref 8.6–10.5)
CHLORIDE SERPL-SCNC: 103 MMOL/L (ref 98–107)
CHOLEST SERPL-MCNC: 194 MG/DL (ref 0–200)
CO2 SERPL-SCNC: 24.3 MMOL/L (ref 22–29)
CORTIS SERPL-MCNC: 14.4 UG/DL
CREAT SERPL-MCNC: 0.73 MG/DL (ref 0.57–1)
DHEA-S SERPL-MCNC: 517 UG/DL (ref 57.3–279.2)
EOSINOPHIL # BLD AUTO: 0.22 10*3/MM3 (ref 0–0.4)
EOSINOPHIL NFR BLD AUTO: 2.5 % (ref 0.3–6.2)
ERYTHROCYTE [DISTWIDTH] IN BLOOD BY AUTOMATED COUNT: 13.5 % (ref 12.3–15.4)
ESTRADIOL SERPL-MCNC: 235 PG/ML
FSH SERPL-ACNC: 10.9 MIU/ML
GLOBULIN SER CALC-MCNC: 2 GM/DL
GLUCOSE SERPL-MCNC: 89 MG/DL (ref 65–99)
HBA1C MFR BLD: 5.2 % (ref 4.8–5.6)
HCT VFR BLD AUTO: 39.9 % (ref 34–46.6)
HDLC SERPL-MCNC: 74 MG/DL (ref 40–60)
HGB BLD-MCNC: 12.9 G/DL (ref 12–15.9)
IMM GRANULOCYTES # BLD AUTO: 0.02 10*3/MM3 (ref 0–0.05)
IMM GRANULOCYTES NFR BLD AUTO: 0.2 % (ref 0–0.5)
INSULIN SERPL-ACNC: 24.9 UIU/ML (ref 2.6–24.9)
LDLC SERPL CALC-MCNC: 107 MG/DL (ref 0–100)
LDLC/HDLC SERPL: 1.44 {RATIO}
LH SERPL-ACNC: 53.8 MIU/ML
LYMPHOCYTES # BLD AUTO: 2.6 10*3/MM3 (ref 0.7–3.1)
LYMPHOCYTES NFR BLD AUTO: 29.5 % (ref 19.6–45.3)
MCH RBC QN AUTO: 28.6 PG (ref 26.6–33)
MCHC RBC AUTO-ENTMCNC: 32.3 G/DL (ref 31.5–35.7)
MCV RBC AUTO: 88.5 FL (ref 79–97)
MONOCYTES # BLD AUTO: 0.46 10*3/MM3 (ref 0.1–0.9)
MONOCYTES NFR BLD AUTO: 5.2 % (ref 5–12)
NEUTROPHILS # BLD AUTO: 5.46 10*3/MM3 (ref 1.7–7)
NEUTROPHILS NFR BLD AUTO: 62 % (ref 42.7–76)
NRBC BLD AUTO-RTO: 0 /100 WBC (ref 0–0.2)
OTHER ANTIBIOTIC SUSC ISLT: ABNORMAL
PLATELET # BLD AUTO: 314 10*3/MM3 (ref 140–450)
POTASSIUM SERPL-SCNC: 4.5 MMOL/L (ref 3.5–5.2)
PROGEST SERPL-MCNC: 1 NG/ML
PROT SERPL-MCNC: 6.7 G/DL (ref 6–8.5)
RBC # BLD AUTO: 4.51 10*6/MM3 (ref 3.77–5.28)
SODIUM SERPL-SCNC: 139 MMOL/L (ref 136–145)
T4 FREE SERPL-MCNC: 1.1 NG/DL (ref 0.93–1.7)
TESTOST SERPL-MCNC: 71 NG/DL (ref 8–48)
TRIGL SERPL-MCNC: 66 MG/DL (ref 0–150)
TSH SERPL DL<=0.005 MIU/L-ACNC: 1.77 UIU/ML (ref 0.27–4.2)
VLDLC SERPL CALC-MCNC: 13.2 MG/DL
WBC # BLD AUTO: 8.81 10*3/MM3 (ref 3.4–10.8)

## 2020-10-06 ENCOUNTER — TELEPHONE (OUTPATIENT)
Dept: OBSTETRICS AND GYNECOLOGY | Facility: CLINIC | Age: 35
End: 2020-10-06

## 2020-10-06 LAB
GEN CATEG CVX/VAG CYTO-IMP: ABNORMAL
HPV I/H RISK 4 DNA CVX QL PROBE+SIG AMP: NOT DETECTED
LAB AP CASE REPORT: ABNORMAL
LAB AP GYN ADDITIONAL INFORMATION: ABNORMAL
PATH INTERP SPEC-IMP: ABNORMAL
STAT OF ADQ CVX/VAG CYTO-IMP: ABNORMAL

## 2020-10-06 NOTE — TELEPHONE ENCOUNTER
Pt calls stating she is in a lot of pain with possible UTI and is requesting results from urine culture on 10/2/2020. Please review.

## 2020-10-07 DIAGNOSIS — N30.00 ACUTE CYSTITIS WITHOUT HEMATURIA: Primary | ICD-10-CM

## 2020-10-07 RX ORDER — PHENAZOPYRIDINE HYDROCHLORIDE 100 MG/1
200 TABLET, FILM COATED ORAL 3 TIMES DAILY PRN
Qty: 48 TABLET | Refills: 0 | Status: SHIPPED | OUTPATIENT
Start: 2020-10-07 | End: 2020-10-17

## 2020-10-07 RX ORDER — NITROFURANTOIN 25; 75 MG/1; MG/1
100 CAPSULE ORAL 2 TIMES DAILY
Qty: 14 CAPSULE | Refills: 0 | Status: SHIPPED | OUTPATIENT
Start: 2020-10-07 | End: 2020-10-16

## 2020-10-07 NOTE — TELEPHONE ENCOUNTER
Informed pt of Macrobid and Pyridium called in to [facility]. Pt then asks about restarting Aldactone. OV from 10/2/2020 states that will be discussed pending lab results.

## 2020-10-19 DIAGNOSIS — R79.89 ELEVATED TESTOSTERONE LEVEL IN FEMALE: Primary | ICD-10-CM

## 2020-10-19 DIAGNOSIS — E88.81 INSULIN RESISTANCE: ICD-10-CM

## 2020-10-19 RX ORDER — SPIRONOLACTONE 25 MG/1
25 TABLET ORAL 2 TIMES DAILY
Qty: 60 TABLET | Refills: 2 | Status: SHIPPED | OUTPATIENT
Start: 2020-10-19 | End: 2021-05-10

## 2020-10-26 ENCOUNTER — TELEPHONE (OUTPATIENT)
Dept: OBSTETRICS AND GYNECOLOGY | Facility: CLINIC | Age: 35
End: 2020-10-26

## 2020-10-26 DIAGNOSIS — E28.2 PCOS (POLYCYSTIC OVARIAN SYNDROME): Primary | ICD-10-CM

## 2020-10-26 DIAGNOSIS — R79.89 ELEVATED DHEA: ICD-10-CM

## 2020-11-11 ENCOUNTER — TELEPHONE (OUTPATIENT)
Dept: OBSTETRICS AND GYNECOLOGY | Facility: CLINIC | Age: 35
End: 2020-11-11

## 2020-11-11 NOTE — TELEPHONE ENCOUNTER
Pt still needs to have CT done and is requesting her intermittent FMLA to be extended another 12 wks. Pt states the original 12 wks was written from Sunita Sommers.

## 2020-11-25 DIAGNOSIS — B00.9 HSV-2 (HERPES SIMPLEX VIRUS 2) INFECTION: ICD-10-CM

## 2020-11-25 RX ORDER — VALACYCLOVIR HYDROCHLORIDE 500 MG/1
TABLET, FILM COATED ORAL
Qty: 30 TABLET | Refills: 4 | Status: SHIPPED | OUTPATIENT
Start: 2020-11-25

## 2020-12-02 PROCEDURE — 87635 SARS-COV-2 COVID-19 AMP PRB: CPT | Performed by: NURSE PRACTITIONER

## 2021-02-08 ENCOUNTER — TELEPHONE (OUTPATIENT)
Dept: VASCULAR SURGERY | Facility: CLINIC | Age: 36
End: 2021-02-08

## 2021-02-08 NOTE — TELEPHONE ENCOUNTER
Left message reminding Mrs Mann of her New Patient appointment for Tuesday, February 9th, 2021 at 915 am with Heather REYNOSO. Also advised Mrs Mann if she had any questions, concerns, or needs to reschedule to please call the office at [phone]

## 2021-02-09 ENCOUNTER — OFFICE VISIT (OUTPATIENT)
Dept: BARIATRICS/WEIGHT MGMT | Facility: CLINIC | Age: 36
End: 2021-02-09

## 2021-02-09 ENCOUNTER — OFFICE VISIT (OUTPATIENT)
Dept: BARIATRICS/WEIGHT MGMT | Facility: HOSPITAL | Age: 36
End: 2021-02-09

## 2021-02-09 VITALS
BODY MASS INDEX: 37.85 KG/M2 | WEIGHT: 213.6 LBS | DIASTOLIC BLOOD PRESSURE: 76 MMHG | HEART RATE: 92 BPM | TEMPERATURE: 97.8 F | SYSTOLIC BLOOD PRESSURE: 126 MMHG | OXYGEN SATURATION: 96 % | HEIGHT: 63 IN

## 2021-02-09 DIAGNOSIS — E66.09 CLASS 2 OBESITY DUE TO EXCESS CALORIES WITH BODY MASS INDEX (BMI) OF 37.0 TO 37.9 IN ADULT, UNSPECIFIED WHETHER SERIOUS COMORBIDITY PRESENT: Primary | ICD-10-CM

## 2021-02-09 DIAGNOSIS — E28.2 PCOS (POLYCYSTIC OVARIAN SYNDROME): ICD-10-CM

## 2021-02-09 DIAGNOSIS — F41.9 ANXIETY: ICD-10-CM

## 2021-02-09 DIAGNOSIS — K76.0 FATTY LIVER: ICD-10-CM

## 2021-02-09 PROCEDURE — 99214 OFFICE O/P EST MOD 30 MIN: CPT | Performed by: NURSE PRACTITIONER

## 2021-02-09 NOTE — PROGRESS NOTES
Patient Care Team:  Barbara Patel APRN as PCP - General (Obstetrics and Gynecology)  Kenneth Hopkins MD as PCP - Family Medicine      Subjective     Patient is a 35 y.o. female presents with morbid obesity and her Body mass index is 37.84 kg/m².     She is here for discussion of surgical weight loss options.  She stated she has been with the disease of obesity for year(s).  She stated she suffers from PCOS, fatty liver, and morbid obesity due to her weight gain.  She stated that weight loss helps alleviate these symptoms.   She stated that she has tried LTL to help with weight loss.  She stated that she has attempted these conservative methods for weight loss without maintaining long term success.  Today she would like to discuss surgical weight loss options such as the Laparoscopic Sleeve Gastrectomy or the Laparoscopic R - Y Gastric Bypass.     Patient states that her hormonal levels for PCOS are very elevated.  She states that she was told weight loss would be her treatment for this.  She is struggled with obesity for years and had some success with LTL around 6 years ago.    Review of Systems   Constitutional: Negative.    Respiratory: Negative.    Cardiovascular: Negative.    Gastrointestinal: Negative.    Endocrine: Negative.    Musculoskeletal: Negative.    Psychiatric/Behavioral: Negative.         History  Past Medical History:   Diagnosis Date   • Anxiety    • Depression    • PCOS (polycystic ovarian syndrome)    • Polycystic ovary syndrome    • UTI (urinary tract infection)       Past Surgical History:   Procedure Laterality Date   • ABDOMINAL SURGERY      transabdominal cerclage   • ABDOMINAL SURGERY      TAC   • CERVICAL BIOPSY  W/ LOOP ELECTRODE EXCISION     •  SECTION      x 2   • KIDNEY SURGERY     • TONSILLECTOMY AND ADENOIDECTOMY     • TUBAL ABDOMINAL LIGATION        Social History     Socioeconomic History   • Marital status:      Spouse name: Not on file   • Number of  children: Not on file   • Years of education: Not on file   • Highest education level: Not on file   Tobacco Use   • Smoking status: Never Smoker   • Smokeless tobacco: Never Used   Substance and Sexual Activity   • Alcohol use: No   • Drug use: No   • Sexual activity: Yes     Partners: Male     Birth control/protection: Surgical      Family History   Problem Relation Age of Onset   • Stroke Father    • Endometrial cancer Mother    • No Known Problems Brother    • No Known Problems Maternal Grandmother    • No Known Problems Maternal Grandfather    • No Known Problems Paternal Grandmother    • Breast cancer Neg Hx    • Ovarian cancer Neg Hx    • Colon cancer Neg Hx       Allergies   Allergen Reactions   • Sulfa Antibiotics Anaphylaxis   • Norco [Hydrocodone-Acetaminophen] GI Intolerance          Current Outpatient Medications:   •  ALPRAZolam (XANAX) 0.25 MG tablet, Take 1 tablet by mouth three times a day as needed, Disp: 90 tablet, Rfl: 0  •  metFORMIN (GLUCOPHAGE) 500 MG tablet, Take one tablet by mouth daily with breakfast, Disp: 90 tablet, Rfl: 0  •  spironolactone (ALDACTONE) 25 MG tablet, Take 1 tablet by mouth twice daily, Disp: 60 tablet, Rfl: 2  •  valACYclovir (VALTREX) 500 MG tablet, Take 1 tablet by mouth twice daily for 3 days, Disp: 30 tablet, Rfl: 4  •  venlafaxine XR (EFFEXOR-XR) 37.5 MG 24 hr capsule, Take 1 capsule by mouth daily, Disp: 30 capsule, Rfl: 0  •  ALPRAZolam (XANAX) 0.5 MG tablet, Take 1 tablet by mouth 3 (Three) Times a Day As Needed for Anxiety., Disp: 90 tablet, Rfl: 0  •  metFORMIN (Glucophage) 500 MG tablet, Take 1 tablet by mouth Daily With Breakfast., Disp: 30 tablet, Rfl: 2  •  spironolactone (Aldactone) 25 MG tablet, Take 1 tablet by mouth 2 (Two) Times a Day., Disp: 60 tablet, Rfl: 2    Objective     Vital Signs  Temp:  [97.8 °F (36.6 °C)] 97.8 °F (36.6 °C)  Heart Rate:  [92] 92  BP: (126)/(76) 126/76  Body mass index is 37.84 kg/m².      02/09/21  0827   Weight: 96.9 kg (213  lb 9.6 oz)       Physical Exam  Vitals signs reviewed.   Constitutional:       Appearance: She is obese.   Eyes:      Pupils: Pupils are equal, round, and reactive to light.   Cardiovascular:      Rate and Rhythm: Normal rate and regular rhythm.   Pulmonary:      Effort: Pulmonary effort is normal.   Abdominal:      General: Bowel sounds are normal.      Palpations: Abdomen is soft.   Musculoskeletal: Normal range of motion.   Skin:     General: Skin is warm and dry.   Neurological:      Mental Status: She is alert and oriented to person, place, and time.   Psychiatric:         Mood and Affect: Mood normal.         Behavior: Behavior normal.            Results Review:   I reviewed the patient's new clinical results.      Patient's Body mass index is 37.84 kg/m². BMI is above normal parameters. Recommendations include: educational material, exercise counseling and nutrition counseling.      Assessment/Plan   Problem List Items Addressed This Visit        Endocrine and Metabolic    Class 2 obesity due to excess calories with body mass index (BMI) of 37.0 to 37.9 in adult - Primary    PCOS (polycystic ovarian syndrome)    Relevant Medications    metFORMIN (Glucophage) 500 MG tablet       Gastrointestinal Abdominal     Fatty liver       Mental Health    Anxiety    Relevant Medications    ALPRAZolam (XANAX) 0.5 MG tablet             I discussed the patient's findings and my recommendations with patient.     I reviewed the prescription meal plan with patient.  It is expected that she will lose weight as she follows her plan.    Goals for patient for this month are:  1.  Begin prescription meal plan.  This consist of 4 meals a day and a protein and vegetable at each meal.  She will see dietitian today to further discuss the meal plan.  2.  Minimum of 64 ounces of water intake each day, and 65 g of protein each day.    She has been provided a structured dietary regimen based off of her behavior.  I discussed with the  "patient the etiology of the disease of obesity and the potential comorbid conditions associated with this disease.  She was instructed to follow the dietary regimen and follow-up with our program in 1 month's time with any additional questions as they may arise during this time.  We emphasized on focusing on proteins and meals high in fiber as well as adequate hydration that exceed 64 ounces of water daily.    I explained that I anticipate the patient to lose weight prior to her next monthly visit.  I have also explained that they need to record or document when they are going to have the \"cheat day\".  She verbalizes understanding at this time.  This is something patient is struggled with for years and is eager for assistance.  She is undergoing some home life stressors and states that she is doing well with them and is ready to focus on herself.  She is aware to call us if she needs help with the meal plan otherwise patient will return in 1 month for weight recheck and to discuss proceeding with a sleeve gastrectomy.    Heather Pollock, EDGARDO     02/09/21  10:04 CST    "

## 2021-02-09 NOTE — PROGRESS NOTES
"Nutrition Services    Patient Name:  Carmen Mann  YOB: 1985  MRN: [id number]  Admit Date:  (Not on file)    NUTRITION BARIATRIC/[facility] NOTE     Visit 1  Initial Assessment   BA#   [facility]    Anthropometrics   Height: 63 in  Weight: 213 lbs 9.6 oz  BMI: 37.84    Nutrition Recall  Eating ___2___ meals daily   Snacking-rarely  Limited sweet intake-has cut out lately  Drinking carbonated beverages-on occasion, but drinks mostly unsweetened tea  Drinking less than 64 fluid ounces-1 bottle       Education    Goal Setting and Information Packet  4 meal per day diet plan  4 meal per day sample menu  \"Perfect Protein, Fiber in Foods, and Reducing Fat\"  Reinforce Nutritional Needs for Surgery  Reinforce Nutritional Needs for [facility]    Nutrition Goals   Eat __4___ meals per day with protein  Eat protein first at meals  Protein goal: 65gms   discussed protein guidelines for shakes and bar  Eliminate snacks  Healthier food choices  Portion control / Use smaller plate or measuring cup   Eliminate soda  Increase fluid intake to 64 ounces per day        Electronically signed by:  Nurys Schneider  02/09/21 09:56 CST   "

## 2021-03-08 ENCOUNTER — OFFICE VISIT (OUTPATIENT)
Dept: BARIATRICS/WEIGHT MGMT | Facility: CLINIC | Age: 36
End: 2021-03-08

## 2021-03-08 VITALS
HEART RATE: 63 BPM | TEMPERATURE: 97.7 F | DIASTOLIC BLOOD PRESSURE: 80 MMHG | SYSTOLIC BLOOD PRESSURE: 136 MMHG | BODY MASS INDEX: 37.92 KG/M2 | HEIGHT: 63 IN | WEIGHT: 214 LBS | OXYGEN SATURATION: 98 %

## 2021-03-08 DIAGNOSIS — E28.2 PCOS (POLYCYSTIC OVARIAN SYNDROME): ICD-10-CM

## 2021-03-08 DIAGNOSIS — E66.01 CLASS 2 SEVERE OBESITY DUE TO EXCESS CALORIES WITH SERIOUS COMORBIDITY AND BODY MASS INDEX (BMI) OF 37.0 TO 37.9 IN ADULT (HCC): ICD-10-CM

## 2021-03-08 DIAGNOSIS — K21.9 GASTROESOPHAGEAL REFLUX DISEASE, UNSPECIFIED WHETHER ESOPHAGITIS PRESENT: Primary | ICD-10-CM

## 2021-03-08 DIAGNOSIS — K76.0 FATTY LIVER: ICD-10-CM

## 2021-03-08 PROCEDURE — 99213 OFFICE O/P EST LOW 20 MIN: CPT | Performed by: SURGERY

## 2021-03-08 NOTE — PROGRESS NOTES
Patient Care Team:  Barbara Patel APRN as PCP - General (Obstetrics and Gynecology)  Kenneth Hopkins MD as PCP - Family Medicine    Reason for Visit:  Surgical Weight loss    Subjective     Patient is a 35 y.o. female presents with morbid obesity and her Body mass index is 37.91 kg/m².     She is here for discussion about the esophagogastroduodenoscopy procedure.  She stated she has been with the disease of obesity for year(s).  She stated she suffers from GERD, fatty liver disease documented by imaging, polycystic ovarian syndrome and morbid obesity due to her weight gain.  Of note the patient states that she has tried PPIs in the past however they cause diarrhea.  To help facilitate control of her reflux symptoms she utilizes Tums and watches what she eats because certain foods exacerbate her GERD as well. She stated that weight loss helps alleviate these symptoms.   She stated that she has tried multiple diet regimens including participating in a medically supervised weight loss program to help with weight loss.  She stated that she has attempted these conservative methods for weight loss without maintaining long term success.        Review of Systems  General ROS: positive for  - sleep disturbance  Respiratory ROS: no cough, shortness of breath, or wheezing  Cardiovascular ROS: no chest pain or dyspnea on exertion  Gastrointestinal ROS: no abdominal pain, change in bowel habits, or black or bloody stools  Musculoskeletal ROS: negative    History  Past Medical History:   Diagnosis Date   • Anxiety    • Depression    • PCOS (polycystic ovarian syndrome)    • Polycystic ovary syndrome    • UTI (urinary tract infection)      Past Surgical History:   Procedure Laterality Date   • ABDOMINAL SURGERY      transabdominal cerclage   • ABDOMINAL SURGERY      TAC   • CERVICAL BIOPSY  W/ LOOP ELECTRODE EXCISION     •  SECTION      x 2   • KIDNEY SURGERY     • TONSILLECTOMY AND ADENOIDECTOMY     • TUBAL  ABDOMINAL LIGATION       Family History   Problem Relation Age of Onset   • Stroke Father    • Endometrial cancer Mother    • No Known Problems Brother    • No Known Problems Maternal Grandmother    • No Known Problems Maternal Grandfather    • No Known Problems Paternal Grandmother    • Breast cancer Neg Hx    • Ovarian cancer Neg Hx    • Colon cancer Neg Hx      Social History     Tobacco Use   • Smoking status: Never Smoker   • Smokeless tobacco: Never Used   Substance Use Topics   • Alcohol use: No   • Drug use: No     E-cigarette/Vaping     E-cigarette/Vaping Substances     (Not in a hospital admission)    Allergies:  Sulfa antibiotics and Norco [hydrocodone-acetaminophen]      Current Outpatient Medications:   •  ALPRAZolam (XANAX) 0.25 MG tablet, Take 1 tablet by mouth three times a day as needed, Disp: 90 tablet, Rfl: 0  •  metFORMIN (GLUCOPHAGE) 500 MG tablet, Take 1 tablet by mouth daily, Disp: 30 tablet, Rfl: 3  •  spironolactone (ALDACTONE) 25 MG tablet, Take 1 tablet by mouth daily, Disp: 30 tablet, Rfl: 2  •  traZODone (DESYREL) 50 MG tablet, Take 1 tablet by mouth nightly, Disp: 30 tablet, Rfl: 1  •  valACYclovir (VALTREX) 500 MG tablet, Take 1 tablet by mouth twice daily for 3 days, Disp: 30 tablet, Rfl: 4  •  venlafaxine XR (EFFEXOR-XR) 75 MG 24 hr capsule, Take 1 capsule by mouth daily, Disp: 30 capsule, Rfl: 2  •  ALPRAZolam (XANAX) 0.5 MG tablet, Take 1 tablet by mouth 3 (Three) Times a Day As Needed for Anxiety., Disp: 90 tablet, Rfl: 0  •  metFORMIN (Glucophage) 500 MG tablet, Take 1 tablet by mouth Daily With Breakfast., Disp: 30 tablet, Rfl: 2  •  spironolactone (Aldactone) 25 MG tablet, Take 1 tablet by mouth 2 (Two) Times a Day., Disp: 60 tablet, Rfl: 2  •  spironolactone (ALDACTONE) 25 MG tablet, Take 1 tablet by mouth twice daily, Disp: 60 tablet, Rfl: 2  •  venlafaxine XR (EFFEXOR-XR) 37.5 MG 24 hr capsule, Take 1 capsule by mouth daily, Disp: 30 capsule, Rfl: 0    Objective     Vital  Signs  Temp:  [97.7 °F (36.5 °C)] 97.7 °F (36.5 °C)  Heart Rate:  [63] 63  BP: (136)/(80) 136/80  Body mass index is 37.91 kg/m².      03/08/21  0823   Weight: 97.1 kg (214 lb)       Physical Exam:      HEENT: extra ocular movement intact and sclera clear, anicteric  Respiratory: appears well, vitals normal, no respiratory distress, acyanotic, normal RR, chest clear, no wheezing, crepitations, rhonchi, normal symmetric air entry  Cardiovascular: Regular rate and rhythm, S1, S2 normal, no murmur, click, rub or gallop  GI: Soft, non-tender, normal bowel sounds; no bruits, organomegaly or masses.  Abnormal shape: obese  Musculoskeletal: inspection - no abnormality  Neurologic: alert, oriented, normal speech, no focal findings or movement disorder noted       Results Review:   None        Assessment/Plan   Encounter Diagnoses   Name Primary?   • Gastroesophageal reflux disease, unspecified whether esophagitis present Yes   • Class 2 severe obesity due to excess calories with serious comorbidity and body mass index (BMI) of 37.0 to 37.9 in adult (CMS/HCC)    • Fatty liver    • PCOS (polycystic ovarian syndrome)              1.  I believe this patient will be a good candidate for weight loss surgery.  I have discussed the Willie - Y Gastric Bypass, laparoscopic sleeve gastrectomy and the Laparoscopic Gastric Band procedures.  We discussed the benefits of the surgeries including the benefit of weight loss and the possible reversal of co-morbid conditions associated with morbid obesity. I explained to the patient that prior to making a definitive decision on the type of surgery she will require an esophagogastroduodenoscopy with biopsies to assess for any contraindications for surgical weight loss.  I explained the alternatives  include not doing anything, or pursuing an UGI series which only offers a diagnosis with potential less accuracy compared to EGD, the benefits of the EGD such as identifying the pathology and anatomy  of the upper GI system, and the risks and complications of the endoscopy were discussed in detail as well. I discussed the risk of perforation (one out of [id number], riskier with dilation), bleeding (one out of 500), and the rare risks of infection, adverse reaction to anesthesia, respiratory failure, cardiac failure including MI and adverse reaction to medications such as allergic reactions. We discussed consequences that could occur if a risk were to develop such as the need for hospitalization, blood transfusion, surgical intervention, medications, pain, disability and death. The patient verbalizes understanding and agrees to proceed. such as bleeding, perforation, swallowing difficulties and gas bloat can occur after this procedure.    Upon completion of our discussion and addressing and answering her questions to her satisfation, informed consent was obtained.  She will be scheduled accordingly for the esophagogastroduodenoscopy procedure.    I discussed the patients findings and my recommendations with patient.     Dr. Pedro Lamb MD [facility]    03/08/21  11:59 CST  Patient Care Team:  Barbara Patel APRN as PCP - General (Obstetrics and Gynecology)  Kenneth Hopkins MD as PCP - Family Medicine

## 2021-03-25 PROBLEM — E66.01 CLASS 2 SEVERE OBESITY DUE TO EXCESS CALORIES WITH SERIOUS COMORBIDITY AND BODY MASS INDEX (BMI) OF 37.0 TO 37.9 IN ADULT (HCC): Status: ACTIVE | Noted: 2021-03-25

## 2021-03-29 ENCOUNTER — TRANSCRIBE ORDERS (OUTPATIENT)
Dept: LAB | Facility: HOSPITAL | Age: 36
End: 2021-03-29

## 2021-03-29 DIAGNOSIS — Z01.818 PRE-OP TESTING: Primary | ICD-10-CM

## 2021-05-10 ENCOUNTER — HOSPITAL ENCOUNTER (OUTPATIENT)
Dept: GENERAL RADIOLOGY | Facility: HOSPITAL | Age: 36
Discharge: HOME | End: 2021-05-10
Admitting: NURSE PRACTITIONER

## 2021-05-10 PROCEDURE — 71046 X-RAY EXAM CHEST 2 VIEWS: CPT

## 2021-05-10 PROCEDURE — 85025 COMPLETE CBC W/AUTO DIFF WBC: CPT | Performed by: NURSE PRACTITIONER

## 2021-05-10 PROCEDURE — 87086 URINE CULTURE/COLONY COUNT: CPT | Performed by: NURSE PRACTITIONER

## 2021-05-10 PROCEDURE — 87635 SARS-COV-2 COVID-19 AMP PRB: CPT | Performed by: NURSE PRACTITIONER

## 2022-02-10 ENCOUNTER — LAB (OUTPATIENT)
Dept: LAB | Facility: HOSPITAL | Age: 37
End: 2022-02-10

## 2022-02-10 ENCOUNTER — TRANSCRIBE ORDERS (OUTPATIENT)
Dept: ADMINISTRATIVE | Facility: HOSPITAL | Age: 37
End: 2022-02-10

## 2022-02-10 DIAGNOSIS — E28.2 POLYCYSTIC OVARIES: Primary | ICD-10-CM

## 2022-02-10 DIAGNOSIS — E28.2 POLYCYSTIC OVARIES: ICD-10-CM

## 2022-02-10 LAB
25(OH)D3 SERPL-MCNC: 27.8 NG/ML (ref 30–100)
ALBUMIN SERPL-MCNC: 4.4 G/DL (ref 3.5–5.2)
ALBUMIN/GLOB SERPL: 2.1 G/DL
ALP SERPL-CCNC: 74 U/L (ref 39–117)
ALT SERPL W P-5'-P-CCNC: 17 U/L (ref 1–33)
ANION GAP SERPL CALCULATED.3IONS-SCNC: 10.7 MMOL/L (ref 5–15)
AST SERPL-CCNC: 13 U/L (ref 1–32)
BASOPHILS # BLD AUTO: 0.03 10*3/MM3 (ref 0–0.2)
BASOPHILS NFR BLD AUTO: 0.4 % (ref 0–1.5)
BILIRUB SERPL-MCNC: 0.4 MG/DL (ref 0–1.2)
BUN SERPL-MCNC: 11 MG/DL (ref 6–20)
BUN/CREAT SERPL: 15.1 (ref 7–25)
CALCIUM SPEC-SCNC: 9.8 MG/DL (ref 8.6–10.5)
CHLORIDE SERPL-SCNC: 104 MMOL/L (ref 98–107)
CHOLEST SERPL-MCNC: 182 MG/DL (ref 0–200)
CO2 SERPL-SCNC: 27.3 MMOL/L (ref 22–29)
CREAT SERPL-MCNC: 0.73 MG/DL (ref 0.57–1)
DEPRECATED RDW RBC AUTO: 39.6 FL (ref 37–54)
EOSINOPHIL # BLD AUTO: 0.11 10*3/MM3 (ref 0–0.4)
EOSINOPHIL NFR BLD AUTO: 1.4 % (ref 0.3–6.2)
ERYTHROCYTE [DISTWIDTH] IN BLOOD BY AUTOMATED COUNT: 12.3 % (ref 12.3–15.4)
FOLATE SERPL-MCNC: 7.86 NG/ML (ref 4.78–24.2)
FSH SERPL-ACNC: 6.08 MIU/ML
GFR SERPL CREATININE-BSD FRML MDRD: 90 ML/MIN/1.73
GLOBULIN UR ELPH-MCNC: 2.1 GM/DL
GLUCOSE SERPL-MCNC: 95 MG/DL (ref 65–99)
HBA1C MFR BLD: 5.4 % (ref 4.8–5.6)
HCT VFR BLD AUTO: 39.3 % (ref 34–46.6)
HDLC SERPL-MCNC: 78 MG/DL (ref 40–60)
HGB BLD-MCNC: 13.1 G/DL (ref 12–15.9)
IMM GRANULOCYTES # BLD AUTO: 0.02 10*3/MM3 (ref 0–0.05)
IMM GRANULOCYTES NFR BLD AUTO: 0.3 % (ref 0–0.5)
LDLC SERPL CALC-MCNC: 93 MG/DL (ref 0–100)
LDLC/HDLC SERPL: 1.19 {RATIO}
LH SERPL-ACNC: 9.74 MIU/ML
LYMPHOCYTES # BLD AUTO: 2.61 10*3/MM3 (ref 0.7–3.1)
LYMPHOCYTES NFR BLD AUTO: 34.2 % (ref 19.6–45.3)
MCH RBC QN AUTO: 29.7 PG (ref 26.6–33)
MCHC RBC AUTO-ENTMCNC: 33.3 G/DL (ref 31.5–35.7)
MCV RBC AUTO: 89.1 FL (ref 79–97)
MONOCYTES # BLD AUTO: 0.44 10*3/MM3 (ref 0.1–0.9)
MONOCYTES NFR BLD AUTO: 5.8 % (ref 5–12)
NEUTROPHILS NFR BLD AUTO: 4.43 10*3/MM3 (ref 1.7–7)
NEUTROPHILS NFR BLD AUTO: 57.9 % (ref 42.7–76)
NRBC BLD AUTO-RTO: 0 /100 WBC (ref 0–0.2)
PLATELET # BLD AUTO: 295 10*3/MM3 (ref 140–450)
PMV BLD AUTO: 11.2 FL (ref 6–12)
POTASSIUM SERPL-SCNC: 4.6 MMOL/L (ref 3.5–5.2)
PROT SERPL-MCNC: 6.5 G/DL (ref 6–8.5)
RBC # BLD AUTO: 4.41 10*6/MM3 (ref 3.77–5.28)
SODIUM SERPL-SCNC: 142 MMOL/L (ref 136–145)
T4 FREE SERPL-MCNC: 1.21 NG/DL (ref 0.93–1.7)
TRIGL SERPL-MCNC: 56 MG/DL (ref 0–150)
TSH SERPL DL<=0.05 MIU/L-ACNC: 1.14 UIU/ML (ref 0.27–4.2)
VIT B12 BLD-MCNC: 384 PG/ML (ref 211–946)
VLDLC SERPL-MCNC: 11 MG/DL (ref 5–40)
WBC NRBC COR # BLD: 7.64 10*3/MM3 (ref 3.4–10.8)

## 2022-02-10 PROCEDURE — 83002 ASSAY OF GONADOTROPIN (LH): CPT

## 2022-02-10 PROCEDURE — 84439 ASSAY OF FREE THYROXINE: CPT

## 2022-02-10 PROCEDURE — 80050 GENERAL HEALTH PANEL: CPT

## 2022-02-10 PROCEDURE — 83036 HEMOGLOBIN GLYCOSYLATED A1C: CPT

## 2022-02-10 PROCEDURE — 82306 VITAMIN D 25 HYDROXY: CPT

## 2022-02-10 PROCEDURE — 80061 LIPID PANEL: CPT

## 2022-02-10 PROCEDURE — 84403 ASSAY OF TOTAL TESTOSTERONE: CPT

## 2022-02-10 PROCEDURE — 82672 ASSAY OF ESTROGEN: CPT

## 2022-02-10 PROCEDURE — 82607 VITAMIN B-12: CPT

## 2022-02-10 PROCEDURE — 83001 ASSAY OF GONADOTROPIN (FSH): CPT

## 2022-02-10 PROCEDURE — 82626 DEHYDROEPIANDROSTERONE: CPT

## 2022-02-10 PROCEDURE — 83525 ASSAY OF INSULIN: CPT

## 2022-02-10 PROCEDURE — 36415 COLL VENOUS BLD VENIPUNCTURE: CPT

## 2022-02-10 PROCEDURE — 82746 ASSAY OF FOLIC ACID SERUM: CPT

## 2022-02-13 LAB — ESTROGEN SERPL-MCNC: 177 PG/ML

## 2022-02-14 LAB — INSULIN SERPL-ACNC: 13 UIU/ML

## 2022-02-17 LAB — TESTOST SERPL-MCNC: 38.2 NG/DL (ref 10–55)

## 2022-02-18 LAB — DHEA SERPL-MCNC: 985 NG/DL (ref 31–701)

## 2022-03-23 ENCOUNTER — HOSPITAL ENCOUNTER (EMERGENCY)
Facility: HOSPITAL | Age: 37
Discharge: HOME | End: 2022-03-23
Admitting: EMERGENCY MEDICINE

## 2022-03-23 ENCOUNTER — APPOINTMENT (OUTPATIENT)
Dept: CT IMAGING | Facility: HOSPITAL | Age: 37
End: 2022-03-23

## 2022-03-23 VITALS
HEART RATE: 57 BPM | SYSTOLIC BLOOD PRESSURE: 131 MMHG | RESPIRATION RATE: 16 BRPM | DIASTOLIC BLOOD PRESSURE: 62 MMHG | OXYGEN SATURATION: 99 % | BODY MASS INDEX: 35.79 KG/M2 | TEMPERATURE: 98.1 F | HEIGHT: 63 IN | WEIGHT: 202 LBS

## 2022-03-23 DIAGNOSIS — Q62.11 STENOSIS OF URETEROPELVIC JUNCTION (UPJ): ICD-10-CM

## 2022-03-23 DIAGNOSIS — R10.9 FLANK PAIN: Primary | ICD-10-CM

## 2022-03-23 LAB
ALBUMIN SERPL-MCNC: 4.3 G/DL (ref 3.5–5.2)
ALBUMIN/GLOB SERPL: 1.7 G/DL
ALP SERPL-CCNC: 67 U/L (ref 39–117)
ALT SERPL W P-5'-P-CCNC: 30 U/L (ref 1–33)
ANION GAP SERPL CALCULATED.3IONS-SCNC: 12 MMOL/L (ref 5–15)
AST SERPL-CCNC: 25 U/L (ref 1–32)
BASOPHILS # BLD AUTO: 0.03 10*3/MM3 (ref 0–0.2)
BASOPHILS NFR BLD AUTO: 0.3 % (ref 0–1.5)
BILIRUB SERPL-MCNC: 0.5 MG/DL (ref 0–1.2)
BILIRUB UR QL STRIP: NEGATIVE
BUN SERPL-MCNC: 8 MG/DL (ref 6–20)
BUN/CREAT SERPL: 13.6 (ref 7–25)
CALCIUM SPEC-SCNC: 9.9 MG/DL (ref 8.6–10.5)
CHLORIDE SERPL-SCNC: 104 MMOL/L (ref 98–107)
CLARITY UR: CLEAR
CO2 SERPL-SCNC: 25 MMOL/L (ref 22–29)
COLOR UR: YELLOW
CREAT SERPL-MCNC: 0.59 MG/DL (ref 0.57–1)
D-LACTATE SERPL-SCNC: 1.5 MMOL/L (ref 0.5–2)
DEPRECATED RDW RBC AUTO: 44.5 FL (ref 37–54)
EGFRCR SERPLBLD CKD-EPI 2021: 119.2 ML/MIN/1.73
EOSINOPHIL # BLD AUTO: 0.1 10*3/MM3 (ref 0–0.4)
EOSINOPHIL NFR BLD AUTO: 1 % (ref 0.3–6.2)
ERYTHROCYTE [DISTWIDTH] IN BLOOD BY AUTOMATED COUNT: 13.4 % (ref 12.3–15.4)
GLOBULIN UR ELPH-MCNC: 2.6 GM/DL
GLUCOSE SERPL-MCNC: 79 MG/DL (ref 65–99)
GLUCOSE UR STRIP-MCNC: NEGATIVE MG/DL
HCG SERPL QL: NEGATIVE
HCT VFR BLD AUTO: 40.8 % (ref 34–46.6)
HGB BLD-MCNC: 13.5 G/DL (ref 12–15.9)
HGB UR QL STRIP.AUTO: NEGATIVE
IMM GRANULOCYTES # BLD AUTO: 0.04 10*3/MM3 (ref 0–0.05)
IMM GRANULOCYTES NFR BLD AUTO: 0.4 % (ref 0–0.5)
KETONES UR QL STRIP: NEGATIVE
LEUKOCYTE ESTERASE UR QL STRIP.AUTO: NEGATIVE
LYMPHOCYTES # BLD AUTO: 2.4 10*3/MM3 (ref 0.7–3.1)
LYMPHOCYTES NFR BLD AUTO: 23.3 % (ref 19.6–45.3)
MCH RBC QN AUTO: 30.3 PG (ref 26.6–33)
MCHC RBC AUTO-ENTMCNC: 33.1 G/DL (ref 31.5–35.7)
MCV RBC AUTO: 91.5 FL (ref 79–97)
MONOCYTES # BLD AUTO: 0.61 10*3/MM3 (ref 0.1–0.9)
MONOCYTES NFR BLD AUTO: 5.9 % (ref 5–12)
NEUTROPHILS NFR BLD AUTO: 69.1 % (ref 42.7–76)
NEUTROPHILS NFR BLD AUTO: 7.12 10*3/MM3 (ref 1.7–7)
NITRITE UR QL STRIP: NEGATIVE
NRBC BLD AUTO-RTO: 0 /100 WBC (ref 0–0.2)
PH UR STRIP.AUTO: 6.5 [PH] (ref 5–8)
PLATELET # BLD AUTO: 269 10*3/MM3 (ref 140–450)
PMV BLD AUTO: 11.4 FL (ref 6–12)
POTASSIUM SERPL-SCNC: 4.1 MMOL/L (ref 3.5–5.2)
PROT SERPL-MCNC: 6.9 G/DL (ref 6–8.5)
PROT UR QL STRIP: NEGATIVE
RBC # BLD AUTO: 4.46 10*6/MM3 (ref 3.77–5.28)
SODIUM SERPL-SCNC: 141 MMOL/L (ref 136–145)
SP GR UR STRIP: 1.01 (ref 1–1.03)
UROBILINOGEN UR QL STRIP: NORMAL
WBC NRBC COR # BLD: 10.3 10*3/MM3 (ref 3.4–10.8)

## 2022-03-23 PROCEDURE — 80053 COMPREHEN METABOLIC PANEL: CPT | Performed by: NURSE PRACTITIONER

## 2022-03-23 PROCEDURE — 83605 ASSAY OF LACTIC ACID: CPT | Performed by: NURSE PRACTITIONER

## 2022-03-23 PROCEDURE — 84703 CHORIONIC GONADOTROPIN ASSAY: CPT | Performed by: NURSE PRACTITIONER

## 2022-03-23 PROCEDURE — 85025 COMPLETE CBC W/AUTO DIFF WBC: CPT | Performed by: NURSE PRACTITIONER

## 2022-03-23 PROCEDURE — 25010000002 IOPAMIDOL 61 % SOLUTION: Performed by: NURSE PRACTITIONER

## 2022-03-23 PROCEDURE — 0 HYDROMORPHONE 1 MG/ML SOLUTION: Performed by: NURSE PRACTITIONER

## 2022-03-23 PROCEDURE — 81003 URINALYSIS AUTO W/O SCOPE: CPT | Performed by: NURSE PRACTITIONER

## 2022-03-23 PROCEDURE — 25010000002 PROCHLORPERAZINE 10 MG/2ML SOLUTION: Performed by: NURSE PRACTITIONER

## 2022-03-23 PROCEDURE — 74177 CT ABD & PELVIS W/CONTRAST: CPT

## 2022-03-23 PROCEDURE — 99283 EMERGENCY DEPT VISIT LOW MDM: CPT

## 2022-03-23 PROCEDURE — 96374 THER/PROPH/DIAG INJ IV PUSH: CPT

## 2022-03-23 PROCEDURE — 96375 TX/PRO/DX INJ NEW DRUG ADDON: CPT

## 2022-03-23 PROCEDURE — 99284 EMERGENCY DEPT VISIT MOD MDM: CPT

## 2022-03-23 PROCEDURE — 87040 BLOOD CULTURE FOR BACTERIA: CPT | Performed by: NURSE PRACTITIONER

## 2022-03-23 RX ORDER — PROCHLORPERAZINE MALEATE 10 MG
10 TABLET ORAL EVERY 6 HOURS PRN
Qty: 15 TABLET | Refills: 0 | Status: SHIPPED | OUTPATIENT
Start: 2022-03-23 | End: 2022-11-10

## 2022-03-23 RX ORDER — OXYCODONE AND ACETAMINOPHEN 7.5; 325 MG/1; MG/1
1 TABLET ORAL EVERY 4 HOURS PRN
Qty: 12 TABLET | Refills: 0 | Status: SHIPPED | OUTPATIENT
Start: 2022-03-23 | End: 2022-03-23

## 2022-03-23 RX ORDER — PROCHLORPERAZINE MALEATE 10 MG
10 TABLET ORAL EVERY 6 HOURS PRN
Qty: 15 TABLET | Refills: 0 | Status: SHIPPED | OUTPATIENT
Start: 2022-03-23 | End: 2022-03-23

## 2022-03-23 RX ORDER — PROCHLORPERAZINE EDISYLATE 5 MG/ML
10 INJECTION INTRAMUSCULAR; INTRAVENOUS ONCE
Status: COMPLETED | OUTPATIENT
Start: 2022-03-23 | End: 2022-03-23

## 2022-03-23 RX ORDER — OXYCODONE AND ACETAMINOPHEN 7.5; 325 MG/1; MG/1
1 TABLET ORAL EVERY 4 HOURS PRN
Qty: 12 TABLET | Refills: 0 | Status: SHIPPED | OUTPATIENT
Start: 2022-03-23 | End: 2022-11-10

## 2022-03-23 RX ADMIN — SODIUM CHLORIDE, POTASSIUM CHLORIDE, SODIUM LACTATE AND CALCIUM CHLORIDE 1000 ML: 600; 310; 30; 20 INJECTION, SOLUTION INTRAVENOUS at 18:10

## 2022-03-23 RX ADMIN — IOPAMIDOL 100 ML: 612 INJECTION, SOLUTION INTRAVENOUS at 19:10

## 2022-03-23 RX ADMIN — HYDROMORPHONE HYDROCHLORIDE 1 MG: 1 INJECTION, SOLUTION INTRAMUSCULAR; INTRAVENOUS; SUBCUTANEOUS at 18:01

## 2022-03-23 RX ADMIN — PROCHLORPERAZINE EDISYLATE 10 MG: 5 INJECTION INTRAMUSCULAR; INTRAVENOUS at 18:02

## 2022-03-23 NOTE — ED PROVIDER NOTES
Subjective   Patient is a 36-year-old white female presents emergency department with a left flank pain, vomiting, dysuria, for the past week.  She has had a urinary tract infection and has completed Augmentin and most recently Omnicef yesterday.  She states she started vomiting today.  She states she has had left flank pain for the past week.  She does have a history of kidney stones and pyelonephritis.  She is not sure if she has had fever because she is been taking Motrin so often. She states that she has been unable to hold down oral fluids today. She states that she has taken zofran x2 today without relirf of symptoms. She states that the pain left flank has been much worse today as well       History provided by:  Patient   used: No        Review of Systems   Constitutional: Negative.    HENT: Negative.    Eyes: Negative.    Respiratory: Negative.    Cardiovascular: Negative.    Gastrointestinal: Negative.    Endocrine: Negative.    Genitourinary:        Patient is a 36-year-old white female presents emergency department with a left flank pain, vomiting, dysuria, for the past week.  She has had a urinary tract infection and has completed Augmentin and most recently Omnicef yesterday.  She states she started vomiting today.  She states she has had left flank pain for the past week.  She does have a history of kidney stones and pyelonephritis.  She is not sure if she has had fever because she is been taking Motrin so often. She states that she has been unable to hold down oral fluids today. She states that she has taken zofran x2 today without relirf of symptoms. She states that the pain left flank has been much worse today as well      Musculoskeletal: Negative.    Skin: Negative.    Allergic/Immunologic: Negative.    Neurological: Negative.    Hematological: Negative.    Psychiatric/Behavioral: Negative.    All other systems reviewed and are negative.      Past Medical History:   Diagnosis  Date   • Anxiety    • Depression    • PCOS (polycystic ovarian syndrome)    • Polycystic ovary syndrome    • UTI (urinary tract infection)        Allergies   Allergen Reactions   • Sulfa Antibiotics Anaphylaxis   • Norco [Hydrocodone-Acetaminophen] GI Intolerance       Past Surgical History:   Procedure Laterality Date   • ABDOMINAL SURGERY      transabdominal cerclage   • ABDOMINAL SURGERY      TAC   • CERVICAL BIOPSY  W/ LOOP ELECTRODE EXCISION     •  SECTION      x 2   • KIDNEY SURGERY     • TONSILLECTOMY AND ADENOIDECTOMY     • TUBAL ABDOMINAL LIGATION         Family History   Problem Relation Age of Onset   • Stroke Father    • Endometrial cancer Mother    • No Known Problems Brother    • No Known Problems Maternal Grandmother    • No Known Problems Maternal Grandfather    • No Known Problems Paternal Grandmother    • Breast cancer Neg Hx    • Ovarian cancer Neg Hx    • Colon cancer Neg Hx        Social History     Socioeconomic History   • Marital status:    Tobacco Use   • Smoking status: Never Smoker   • Smokeless tobacco: Never Used   Substance and Sexual Activity   • Alcohol use: No   • Drug use: No   • Sexual activity: Yes     Partners: Male     Birth control/protection: Surgical       Prior to Admission medications    Medication Sig Start Date End Date Taking? Authorizing Provider   albuterol sulfate  (90 Base) MCG/ACT inhaler Inhale 2 puffs every 4-6 hours as needed 21      albuterol sulfate  (90 Base) MCG/ACT inhaler Inhale 2 puffs every 4-6 hours as needed 21      albuterol sulfate  (90 Base) MCG/ACT inhaler Inhale 2 puffs every 4-6 hours as needed 2/10/22      ALPRAZolam (XANAX) 0.25 MG tablet Take 1 tablet by mouth three times a day as needed. 3/26/21      ALPRAZolam (XANAX) 0.25 MG tablet Take 1 tablet by mouth three times a day as needed 21      azithromycin (ZITHROMAX) 250 MG tablet Take 2 tablets by mouth today then 1 tablet daily for 4  more days. 12/20/21      cefdinir (OMNICEF) 300 MG capsule Take 1 capsule by mouth twice a day 5/5/21      clarithromycin (BIAXIN) 500 MG tablet Take 1 tablet by mouth twice a day 5/13/21      metFORMIN (GLUCOPHAGE) 500 MG tablet Take 1 tablet by mouth daily 2/22/21      metFORMIN (GLUCOPHAGE) 500 MG tablet Take 1 tablet by mouth daily 2/10/22      methylPREDNISolone (MEDROL) 4 MG dose pack Take as directed on package instructions. 5/10/21   Rosita Cervantes APRN   methylPREDNISolone (MEDROL) 4 MG dose pack Follow package directions. 12/20/21      pantoprazole (PROTONIX) 40 MG EC tablet Take 1 tablet by mouth daily 8/10/21      pantoprazole (PROTONIX) 40 MG EC tablet Take 1 tablet by mouth daily 8/10/21      pantoprazole (PROTONIX) 40 MG EC tablet Take 1 tablet by mouth daily 2/10/22      predniSONE (DELTASONE) 10 MG tablet Take 1 tablet by mouth 3 times a day for 3 days, then 1 tablets 2  times a day for 3 days, then 1 tablet 1 time daily for 3 days as directed 5/13/21      Semaglutide-Weight Management (Wegovy) 0.25 MG/0.5ML solution auto-injector Inject 0.25 mg under the skin into the appropriate area as directed 1 (One) Time Per Week. 2/10/22      Semaglutide-Weight Management (Wegovy) 0.5 MG/0.5ML solution auto-injector Inject 0.5 mg under the skin into the appropriate area as directed Every 7 (Seven) Days. 3/10/22      spironolactone (ALDACTONE) 25 MG tablet Take 1 tablet by mouth daily 2/22/21      spironolactone (ALDACTONE) 25 MG tablet Take 1 tablet by mouth daily 2/10/22      SUMAtriptan (IMITREX) 50 MG tablet Take 1 tablet by mouth daily 10/4/21      traZODone (DESYREL) 50 MG tablet Take 1 tablet by mouth nightly 2/10/22      valACYclovir (VALTREX) 500 MG tablet Take 1 tablet by mouth twice daily for 3 days 11/25/20   Barbara Patel APRN   venlafaxine XR (EFFEXOR-XR) 75 MG 24 hr capsule Take 1 capsule by mouth Daily. 9/17/21      venlafaxine XR (EFFEXOR-XR) 75 MG 24 hr capsule Take 1 capsule by  "mouth daily 9/17/21      venlafaxine XR (EFFEXOR-XR) 75 MG 24 hr capsule Take 1 capsule by mouth daily 2/10/22          /62   Pulse 57   Temp 98.1 °F (36.7 °C) (Oral)   Resp 16   Ht 160 cm (63\")   Wt 91.6 kg (202 lb)   LMP 03/05/2022 (Exact Date)   SpO2 99%   BMI 35.78 kg/m²     Objective   Physical Exam  Vitals and nursing note reviewed.   Constitutional:       Appearance: She is well-developed.      Comments: Appears to be in pain. Nontoxic appearing    HENT:      Head: Normocephalic and atraumatic.   Eyes:      Conjunctiva/sclera: Conjunctivae normal.      Pupils: Pupils are equal, round, and reactive to light.   Neck:      Thyroid: No thyromegaly.      Trachea: No tracheal deviation.   Cardiovascular:      Rate and Rhythm: Normal rate and regular rhythm.      Heart sounds: Normal heart sounds.   Pulmonary:      Effort: Pulmonary effort is normal. No respiratory distress.      Breath sounds: Normal breath sounds. No wheezing or rales.   Chest:      Chest wall: No tenderness.   Abdominal:      General: Bowel sounds are normal.      Palpations: Abdomen is soft.      Comments: Abdomen is soft, nondistended.  There is no abdominal tenderness.  She has significant left CVA tenderness on percussion.   Musculoskeletal:         General: Normal range of motion.      Cervical back: Normal range of motion and neck supple.   Skin:     General: Skin is warm and dry.      Comments: Mild pallor noted.    Neurological:      Mental Status: She is alert and oriented to person, place, and time.      Cranial Nerves: No cranial nerve deficit.      Deep Tendon Reflexes: Reflexes are normal and symmetric.   Psychiatric:         Behavior: Behavior normal.         Thought Content: Thought content normal.         Judgment: Judgment normal.         Procedures         Lab Results (last 24 hours)     Procedure Component Value Units Date/Time    CBC & Differential [[id number]]  (Abnormal) Collected: 03/23/22 1800    Specimen: " Blood Updated: 03/23/22 1820    Narrative:      The following orders were created for panel order CBC & Differential.  Procedure                               Abnormality         Status                     ---------                               -----------         ------                     CBC Auto Differential[[id number]]        Abnormal            Final result                 Please view results for these tests on the individual orders.    Comprehensive Metabolic Panel [[id number]] Collected: 03/23/22 1800    Specimen: Blood Updated: 03/23/22 1857     Glucose 79 mg/dL      BUN 8 mg/dL      Creatinine 0.59 mg/dL      Sodium 141 mmol/L      Potassium 4.1 mmol/L      Comment: Slight hemolysis detected by analyzer. Results may be affected.        Chloride 104 mmol/L      CO2 25.0 mmol/L      Calcium 9.9 mg/dL      Total Protein 6.9 g/dL      Albumin 4.30 g/dL      ALT (SGPT) 30 U/L      AST (SGOT) 25 U/L      Alkaline Phosphatase 67 U/L      Total Bilirubin 0.5 mg/dL      Globulin 2.6 gm/dL      A/G Ratio 1.7 g/dL      BUN/Creatinine Ratio 13.6     Anion Gap 12.0 mmol/L      eGFR 119.2 mL/min/1.73      Comment: National Kidney Foundation and American Society of Nephrology (ASN) Task Force recommended calculation based on the Chronic Kidney Disease Epidemiology Collaboration (CKD-EPI) equation refit without adjustment for race.       Narrative:      GFR Normal >60  Chronic Kidney Disease <60  Kidney Failure <15      Lactic Acid, Plasma [[id number]]  (Normal) Collected: 03/23/22 1800    Specimen: Blood Updated: 03/23/22 1831     Lactate 1.5 mmol/L     Blood Culture With CON - Blood, Hand, Left [[id number]]  (Normal) Collected: 03/23/22 1800    Specimen: Blood from Hand, Left Updated: 03/24/22 0634     Blood Culture No growth at less than 24 hours    hCG, Serum, Qualitative [[id number]]  (Normal) Collected: 03/23/22 1800    Specimen: Blood Updated: 03/23/22 1835     HCG Qualitative Negative    CBC Auto Differential  [[id number]]  (Abnormal) Collected: 03/23/22 1800    Specimen: Blood Updated: 03/23/22 1820     WBC 10.30 10*3/mm3      RBC 4.46 10*6/mm3      Hemoglobin 13.5 g/dL      Hematocrit 40.8 %      MCV 91.5 fL      MCH 30.3 pg      MCHC 33.1 g/dL      RDW 13.4 %      RDW-SD 44.5 fl      MPV 11.4 fL      Platelets 269 10*3/mm3      Neutrophil % 69.1 %      Lymphocyte % 23.3 %      Monocyte % 5.9 %      Eosinophil % 1.0 %      Basophil % 0.3 %      Immature Grans % 0.4 %      Neutrophils, Absolute 7.12 10*3/mm3      Lymphocytes, Absolute 2.40 10*3/mm3      Monocytes, Absolute 0.61 10*3/mm3      Eosinophils, Absolute 0.10 10*3/mm3      Basophils, Absolute 0.03 10*3/mm3      Immature Grans, Absolute 0.04 10*3/mm3      nRBC 0.0 /100 WBC     Urinalysis With Culture If Indicated - Urine, Clean Catch [[id number]]  (Normal) Collected: 03/23/22 1801    Specimen: Urine, Clean Catch Updated: 03/23/22 1818     Color, UA Yellow     Appearance, UA Clear     pH, UA 6.5     Specific Gravity, UA 1.008     Glucose, UA Negative     Ketones, UA Negative     Bilirubin, UA Negative     Blood, UA Negative     Protein, UA Negative     Leuk Esterase, UA Negative     Nitrite, UA Negative     Urobilinogen, UA 0.2 E.U./dL    Narrative:      Urine microscopic not indicated.          CT Abdomen Pelvis With Contrast   Final Result       1.  No acute finding or significant change.        2.  Chronic mild LEFT hydronephrosis related to UPJ stenosis with   resultant LEFT renal cortical thinning. Punctate nonobstructing LEFT   upper pole renal calculus. No ureteral stone. No RIGHT hydronephrosis.       3.  Mild diffuse hepatic steatosis.   This report was finalized on 03/23/2022 19:15 by Dr. Tyler Tao MD.          ED Course  ED Course as of 03/24/22 0811   Wed Mar 23, 2022   1850 Pending ct scan of abd/pelvis at this time  [CW]   1932 IMPRESSION:     1.  No acute finding or significant change.      2.  Chronic mild LEFT hydronephrosis related to UPJ  stenosis with  resultant LEFT renal cortical thinning. Punctate nonobstructing LEFT  upper pole renal calculus. No ureteral stone. No RIGHT hydronephrosis.     3.  Mild diffuse hepatic steatosis.  This report was finalized on 03/23/2022 19:15 by Dr. Tyler Tao MD.    Pt states that pain and nausea are much better. Reviewed ct scan results with pt. She states that she will make appointment with dr casiano for follow up. Pt will be discharged home with pain medication and antiemetics. Advised to return to er if symptoms worsen  [CW]      ED Course User Index  [CW] Chelo Grace, EDGARDO          MDM  Number of Diagnoses or Management Options  Flank pain: minor  Stenosis of ureteropelvic junction (UPJ): established and worsening     Amount and/or Complexity of Data Reviewed  Clinical lab tests: ordered and reviewed  Tests in the radiology section of CPT®: ordered and reviewed    Patient Progress  Patient progress: stable      Final diagnoses:   Flank pain   Stenosis of ureteropelvic junction (UPJ)          Chelo Grace, EDGARDO  03/24/22 0811

## 2022-03-27 ENCOUNTER — HOSPITAL ENCOUNTER (OUTPATIENT)
Facility: HOSPITAL | Age: 37
Setting detail: OBSERVATION
Discharge: HOME | End: 2022-03-28
Attending: FAMILY MEDICINE | Admitting: STUDENT IN AN ORGANIZED HEALTH CARE EDUCATION/TRAINING PROGRAM

## 2022-03-27 ENCOUNTER — APPOINTMENT (OUTPATIENT)
Dept: CT IMAGING | Facility: HOSPITAL | Age: 37
End: 2022-03-27

## 2022-03-27 DIAGNOSIS — R11.2 INTRACTABLE NAUSEA AND VOMITING: Primary | ICD-10-CM

## 2022-03-27 DIAGNOSIS — R10.9 FLANK PAIN: ICD-10-CM

## 2022-03-27 PROBLEM — R31.9 HEMATURIA: Status: ACTIVE | Noted: 2022-03-27

## 2022-03-27 LAB
ALBUMIN SERPL-MCNC: 4.5 G/DL (ref 3.5–5.2)
ALBUMIN/GLOB SERPL: 2 G/DL
ALP SERPL-CCNC: 74 U/L (ref 39–117)
ALT SERPL W P-5'-P-CCNC: 25 U/L (ref 1–33)
ANION GAP SERPL CALCULATED.3IONS-SCNC: 10 MMOL/L (ref 5–15)
AST SERPL-CCNC: 18 U/L (ref 1–32)
B-HCG UR QL: NEGATIVE
BASOPHILS # BLD AUTO: 0.05 10*3/MM3 (ref 0–0.2)
BASOPHILS NFR BLD AUTO: 0.4 % (ref 0–1.5)
BILIRUB SERPL-MCNC: 0.3 MG/DL (ref 0–1.2)
BILIRUB UR QL STRIP: NEGATIVE
BUN SERPL-MCNC: 14 MG/DL (ref 6–20)
BUN/CREAT SERPL: 23 (ref 7–25)
CALCIUM SPEC-SCNC: 10 MG/DL (ref 8.6–10.5)
CHLORIDE SERPL-SCNC: 105 MMOL/L (ref 98–107)
CLARITY UR: CLEAR
CO2 SERPL-SCNC: 26 MMOL/L (ref 22–29)
COLOR UR: YELLOW
CREAT SERPL-MCNC: 0.61 MG/DL (ref 0.57–1)
CRP SERPL-MCNC: 0.36 MG/DL (ref 0–0.5)
D-LACTATE SERPL-SCNC: 1.6 MMOL/L (ref 0.5–2)
DEPRECATED RDW RBC AUTO: 44.3 FL (ref 37–54)
EGFRCR SERPLBLD CKD-EPI 2021: 118.3 ML/MIN/1.73
EOSINOPHIL # BLD AUTO: 0.08 10*3/MM3 (ref 0–0.4)
EOSINOPHIL NFR BLD AUTO: 0.6 % (ref 0.3–6.2)
ERYTHROCYTE [DISTWIDTH] IN BLOOD BY AUTOMATED COUNT: 13.6 % (ref 12.3–15.4)
EXPIRATION DATE: NORMAL
GLOBULIN UR ELPH-MCNC: 2.3 GM/DL
GLUCOSE SERPL-MCNC: 96 MG/DL (ref 65–99)
GLUCOSE UR STRIP-MCNC: NEGATIVE MG/DL
HCT VFR BLD AUTO: 40.2 % (ref 34–46.6)
HGB BLD-MCNC: 13.4 G/DL (ref 12–15.9)
HGB UR QL STRIP.AUTO: NEGATIVE
IMM GRANULOCYTES # BLD AUTO: 0.06 10*3/MM3 (ref 0–0.05)
IMM GRANULOCYTES NFR BLD AUTO: 0.5 % (ref 0–0.5)
INTERNAL NEGATIVE CONTROL: NEGATIVE
INTERNAL POSITIVE CONTROL: POSITIVE
KETONES UR QL STRIP: NEGATIVE
LEUKOCYTE ESTERASE UR QL STRIP.AUTO: NEGATIVE
LIPASE SERPL-CCNC: 20 U/L (ref 13–60)
LYMPHOCYTES # BLD AUTO: 2.2 10*3/MM3 (ref 0.7–3.1)
LYMPHOCYTES NFR BLD AUTO: 17.5 % (ref 19.6–45.3)
Lab: NORMAL
MCH RBC QN AUTO: 29.8 PG (ref 26.6–33)
MCHC RBC AUTO-ENTMCNC: 33.3 G/DL (ref 31.5–35.7)
MCV RBC AUTO: 89.3 FL (ref 79–97)
MONOCYTES # BLD AUTO: 0.74 10*3/MM3 (ref 0.1–0.9)
MONOCYTES NFR BLD AUTO: 5.9 % (ref 5–12)
NEUTROPHILS NFR BLD AUTO: 75.1 % (ref 42.7–76)
NEUTROPHILS NFR BLD AUTO: 9.43 10*3/MM3 (ref 1.7–7)
NITRITE UR QL STRIP: NEGATIVE
NRBC BLD AUTO-RTO: 0 /100 WBC (ref 0–0.2)
PH UR STRIP.AUTO: 6.5 [PH] (ref 5–8)
PLATELET # BLD AUTO: 269 10*3/MM3 (ref 140–450)
PMV BLD AUTO: 10.9 FL (ref 6–12)
POTASSIUM SERPL-SCNC: 4.5 MMOL/L (ref 3.5–5.2)
PROCALCITONIN SERPL-MCNC: <0.2 NG/ML (ref 0–0.25)
PROT SERPL-MCNC: 6.8 G/DL (ref 6–8.5)
PROT UR QL STRIP: NEGATIVE
RBC # BLD AUTO: 4.5 10*6/MM3 (ref 3.77–5.28)
SARS-COV-2 RNA PNL SPEC NAA+PROBE: NOT DETECTED
SODIUM SERPL-SCNC: 141 MMOL/L (ref 136–145)
SP GR UR STRIP: 1.02 (ref 1–1.03)
UROBILINOGEN UR QL STRIP: NORMAL
WBC NRBC COR # BLD: 12.56 10*3/MM3 (ref 3.4–10.8)

## 2022-03-27 PROCEDURE — 99213 OFFICE O/P EST LOW 20 MIN: CPT | Performed by: UROLOGY

## 2022-03-27 PROCEDURE — 96374 THER/PROPH/DIAG INJ IV PUSH: CPT

## 2022-03-27 PROCEDURE — 99284 EMERGENCY DEPT VISIT MOD MDM: CPT

## 2022-03-27 PROCEDURE — C9803 HOPD COVID-19 SPEC COLLECT: HCPCS

## 2022-03-27 PROCEDURE — 96372 THER/PROPH/DIAG INJ SC/IM: CPT

## 2022-03-27 PROCEDURE — 96361 HYDRATE IV INFUSION ADD-ON: CPT

## 2022-03-27 PROCEDURE — 96376 TX/PRO/DX INJ SAME DRUG ADON: CPT

## 2022-03-27 PROCEDURE — G0378 HOSPITAL OBSERVATION PER HR: HCPCS

## 2022-03-27 PROCEDURE — 84145 PROCALCITONIN (PCT): CPT | Performed by: INTERNAL MEDICINE

## 2022-03-27 PROCEDURE — P9612 CATHETERIZE FOR URINE SPEC: HCPCS

## 2022-03-27 PROCEDURE — 83605 ASSAY OF LACTIC ACID: CPT | Performed by: NURSE PRACTITIONER

## 2022-03-27 PROCEDURE — 86140 C-REACTIVE PROTEIN: CPT | Performed by: INTERNAL MEDICINE

## 2022-03-27 PROCEDURE — 25010000002 PROMETHAZINE PER 50 MG: Performed by: INTERNAL MEDICINE

## 2022-03-27 PROCEDURE — 25010000002 ENOXAPARIN PER 10 MG: Performed by: INTERNAL MEDICINE

## 2022-03-27 PROCEDURE — 87040 BLOOD CULTURE FOR BACTERIA: CPT | Performed by: INTERNAL MEDICINE

## 2022-03-27 PROCEDURE — 25010000002 SODIUM CHLORIDE 0.9 % WITH KCL 20 MEQ 20-0.9 MEQ/L-% SOLUTION: Performed by: INTERNAL MEDICINE

## 2022-03-27 PROCEDURE — 85025 COMPLETE CBC W/AUTO DIFF WBC: CPT | Performed by: NURSE PRACTITIONER

## 2022-03-27 PROCEDURE — 81003 URINALYSIS AUTO W/O SCOPE: CPT | Performed by: NURSE PRACTITIONER

## 2022-03-27 PROCEDURE — 96375 TX/PRO/DX INJ NEW DRUG ADDON: CPT

## 2022-03-27 PROCEDURE — 87635 SARS-COV-2 COVID-19 AMP PRB: CPT | Performed by: NURSE PRACTITIONER

## 2022-03-27 PROCEDURE — 25010000002 KETOROLAC TROMETHAMINE PER 15 MG: Performed by: INTERNAL MEDICINE

## 2022-03-27 PROCEDURE — 83690 ASSAY OF LIPASE: CPT | Performed by: NURSE PRACTITIONER

## 2022-03-27 PROCEDURE — 81025 URINE PREGNANCY TEST: CPT | Performed by: NURSE PRACTITIONER

## 2022-03-27 PROCEDURE — 25010000002 ONDANSETRON PER 1 MG: Performed by: NURSE PRACTITIONER

## 2022-03-27 PROCEDURE — 80053 COMPREHEN METABOLIC PANEL: CPT | Performed by: NURSE PRACTITIONER

## 2022-03-27 PROCEDURE — 74176 CT ABD & PELVIS W/O CONTRAST: CPT

## 2022-03-27 RX ORDER — SODIUM CHLORIDE AND POTASSIUM CHLORIDE 150; 900 MG/100ML; MG/100ML
125 INJECTION, SOLUTION INTRAVENOUS CONTINUOUS
Status: DISCONTINUED | OUTPATIENT
Start: 2022-03-27 | End: 2022-03-28

## 2022-03-27 RX ORDER — KETOROLAC TROMETHAMINE 30 MG/ML
15 INJECTION, SOLUTION INTRAMUSCULAR; INTRAVENOUS EVERY 6 HOURS PRN
Status: DISCONTINUED | OUTPATIENT
Start: 2022-03-27 | End: 2022-03-28 | Stop reason: HOSPADM

## 2022-03-27 RX ORDER — ONDANSETRON 2 MG/ML
4 INJECTION INTRAMUSCULAR; INTRAVENOUS ONCE
Status: COMPLETED | OUTPATIENT
Start: 2022-03-27 | End: 2022-03-27

## 2022-03-27 RX ORDER — ACETAMINOPHEN 160 MG/5ML
650 SOLUTION ORAL EVERY 4 HOURS PRN
Status: DISCONTINUED | OUTPATIENT
Start: 2022-03-27 | End: 2022-03-28 | Stop reason: HOSPADM

## 2022-03-27 RX ORDER — SODIUM CHLORIDE 0.9 % (FLUSH) 0.9 %
10 SYRINGE (ML) INJECTION AS NEEDED
Status: DISCONTINUED | OUTPATIENT
Start: 2022-03-27 | End: 2022-03-28 | Stop reason: HOSPADM

## 2022-03-27 RX ORDER — ONDANSETRON 2 MG/ML
4 INJECTION INTRAMUSCULAR; INTRAVENOUS EVERY 6 HOURS PRN
Status: DISCONTINUED | OUTPATIENT
Start: 2022-03-27 | End: 2022-03-28

## 2022-03-27 RX ORDER — SODIUM CHLORIDE 0.9 % (FLUSH) 0.9 %
10 SYRINGE (ML) INJECTION EVERY 12 HOURS SCHEDULED
Status: DISCONTINUED | OUTPATIENT
Start: 2022-03-27 | End: 2022-03-28 | Stop reason: HOSPADM

## 2022-03-27 RX ADMIN — POTASSIUM CHLORIDE AND SODIUM CHLORIDE 125 ML/HR: 900; 150 INJECTION, SOLUTION INTRAVENOUS at 15:21

## 2022-03-27 RX ADMIN — KETOROLAC TROMETHAMINE 15 MG: 30 INJECTION, SOLUTION INTRAMUSCULAR; INTRAVENOUS at 18:11

## 2022-03-27 RX ADMIN — ONDANSETRON 4 MG: 2 INJECTION INTRAMUSCULAR; INTRAVENOUS at 09:32

## 2022-03-27 RX ADMIN — HYDROMORPHONE HYDROCHLORIDE 1 MG: 1 INJECTION, SOLUTION INTRAMUSCULAR; INTRAVENOUS; SUBCUTANEOUS at 08:35

## 2022-03-27 RX ADMIN — PROMETHAZINE HYDROCHLORIDE 12.5 MG: 25 INJECTION INTRAMUSCULAR; INTRAVENOUS at 13:58

## 2022-03-27 RX ADMIN — ONDANSETRON 4 MG: 2 INJECTION INTRAMUSCULAR; INTRAVENOUS at 08:35

## 2022-03-27 RX ADMIN — ENOXAPARIN SODIUM 40 MG: 40 INJECTION SUBCUTANEOUS at 13:56

## 2022-03-27 RX ADMIN — PROMETHAZINE HYDROCHLORIDE 12.5 MG: 25 INJECTION INTRAMUSCULAR; INTRAVENOUS at 21:59

## 2022-03-27 RX ADMIN — Medication 10 ML: at 13:56

## 2022-03-27 RX ADMIN — POTASSIUM CHLORIDE AND SODIUM CHLORIDE 125 ML/HR: 900; 150 INJECTION, SOLUTION INTRAVENOUS at 23:34

## 2022-03-27 RX ADMIN — HYDROMORPHONE HYDROCHLORIDE 0.5 MG: 1 INJECTION, SOLUTION INTRAMUSCULAR; INTRAVENOUS; SUBCUTANEOUS at 11:59

## 2022-03-27 RX ADMIN — SODIUM CHLORIDE, POTASSIUM CHLORIDE, SODIUM LACTATE AND CALCIUM CHLORIDE 1000 ML: 600; 310; 30; 20 INJECTION, SOLUTION INTRAVENOUS at 08:37

## 2022-03-28 VITALS
RESPIRATION RATE: 16 BRPM | OXYGEN SATURATION: 99 % | SYSTOLIC BLOOD PRESSURE: 119 MMHG | HEIGHT: 63 IN | WEIGHT: 200 LBS | BODY MASS INDEX: 35.44 KG/M2 | TEMPERATURE: 97.3 F | HEART RATE: 70 BPM | DIASTOLIC BLOOD PRESSURE: 57 MMHG

## 2022-03-28 LAB
ALBUMIN SERPL-MCNC: 3.6 G/DL (ref 3.5–5.2)
ALBUMIN/GLOB SERPL: 1.6 G/DL
ALP SERPL-CCNC: 52 U/L (ref 39–117)
ALT SERPL W P-5'-P-CCNC: 19 U/L (ref 1–33)
ANION GAP SERPL CALCULATED.3IONS-SCNC: 10 MMOL/L (ref 5–15)
AST SERPL-CCNC: 24 U/L (ref 1–32)
BACTERIA SPEC AEROBE CULT: NORMAL
BASOPHILS # BLD AUTO: 0.03 10*3/MM3 (ref 0–0.2)
BASOPHILS NFR BLD AUTO: 0.4 % (ref 0–1.5)
BILIRUB SERPL-MCNC: 0.6 MG/DL (ref 0–1.2)
BUN SERPL-MCNC: 11 MG/DL (ref 6–20)
BUN/CREAT SERPL: 17.7 (ref 7–25)
CALCIUM SPEC-SCNC: 8.8 MG/DL (ref 8.6–10.5)
CHLORIDE SERPL-SCNC: 106 MMOL/L (ref 98–107)
CO2 SERPL-SCNC: 25 MMOL/L (ref 22–29)
CREAT SERPL-MCNC: 0.62 MG/DL (ref 0.57–1)
DEPRECATED RDW RBC AUTO: 46.5 FL (ref 37–54)
EGFRCR SERPLBLD CKD-EPI 2021: 117.8 ML/MIN/1.73
EOSINOPHIL # BLD AUTO: 0.13 10*3/MM3 (ref 0–0.4)
EOSINOPHIL NFR BLD AUTO: 1.8 % (ref 0.3–6.2)
ERYTHROCYTE [DISTWIDTH] IN BLOOD BY AUTOMATED COUNT: 13.8 % (ref 12.3–15.4)
GLOBULIN UR ELPH-MCNC: 2.2 GM/DL
GLUCOSE SERPL-MCNC: 78 MG/DL (ref 65–99)
HCT VFR BLD AUTO: 35.2 % (ref 34–46.6)
HGB BLD-MCNC: 11.5 G/DL (ref 12–15.9)
IMM GRANULOCYTES # BLD AUTO: 0.02 10*3/MM3 (ref 0–0.05)
IMM GRANULOCYTES NFR BLD AUTO: 0.3 % (ref 0–0.5)
LYMPHOCYTES # BLD AUTO: 3.07 10*3/MM3 (ref 0.7–3.1)
LYMPHOCYTES NFR BLD AUTO: 42.5 % (ref 19.6–45.3)
MCH RBC QN AUTO: 29.9 PG (ref 26.6–33)
MCHC RBC AUTO-ENTMCNC: 32.7 G/DL (ref 31.5–35.7)
MCV RBC AUTO: 91.7 FL (ref 79–97)
MONOCYTES # BLD AUTO: 0.42 10*3/MM3 (ref 0.1–0.9)
MONOCYTES NFR BLD AUTO: 5.8 % (ref 5–12)
NEUTROPHILS NFR BLD AUTO: 3.56 10*3/MM3 (ref 1.7–7)
NEUTROPHILS NFR BLD AUTO: 49.2 % (ref 42.7–76)
NRBC BLD AUTO-RTO: 0 /100 WBC (ref 0–0.2)
PLATELET # BLD AUTO: 201 10*3/MM3 (ref 140–450)
PMV BLD AUTO: 11.2 FL (ref 6–12)
POTASSIUM SERPL-SCNC: 4.3 MMOL/L (ref 3.5–5.2)
PROT SERPL-MCNC: 5.8 G/DL (ref 6–8.5)
RBC # BLD AUTO: 3.84 10*6/MM3 (ref 3.77–5.28)
SODIUM SERPL-SCNC: 141 MMOL/L (ref 136–145)
WBC NRBC COR # BLD: 7.23 10*3/MM3 (ref 3.4–10.8)

## 2022-03-28 PROCEDURE — 80053 COMPREHEN METABOLIC PANEL: CPT | Performed by: INTERNAL MEDICINE

## 2022-03-28 PROCEDURE — 25010000002 ONDANSETRON PER 1 MG: Performed by: STUDENT IN AN ORGANIZED HEALTH CARE EDUCATION/TRAINING PROGRAM

## 2022-03-28 PROCEDURE — 96376 TX/PRO/DX INJ SAME DRUG ADON: CPT

## 2022-03-28 PROCEDURE — 96361 HYDRATE IV INFUSION ADD-ON: CPT

## 2022-03-28 PROCEDURE — 25010000002 KETOROLAC TROMETHAMINE PER 15 MG: Performed by: INTERNAL MEDICINE

## 2022-03-28 PROCEDURE — 85025 COMPLETE CBC W/AUTO DIFF WBC: CPT | Performed by: INTERNAL MEDICINE

## 2022-03-28 PROCEDURE — G0378 HOSPITAL OBSERVATION PER HR: HCPCS

## 2022-03-28 PROCEDURE — 25010000002 SODIUM CHLORIDE 0.9 % WITH KCL 20 MEQ 20-0.9 MEQ/L-% SOLUTION: Performed by: INTERNAL MEDICINE

## 2022-03-28 RX ORDER — ONDANSETRON 2 MG/ML
4 INJECTION INTRAMUSCULAR; INTRAVENOUS EVERY 8 HOURS SCHEDULED
Status: DISCONTINUED | OUTPATIENT
Start: 2022-03-28 | End: 2022-03-28 | Stop reason: HOSPADM

## 2022-03-28 RX ORDER — SODIUM CHLORIDE, SODIUM LACTATE, POTASSIUM CHLORIDE, CALCIUM CHLORIDE 600; 310; 30; 20 MG/100ML; MG/100ML; MG/100ML; MG/100ML
125 INJECTION, SOLUTION INTRAVENOUS CONTINUOUS
Status: DISCONTINUED | OUTPATIENT
Start: 2022-03-28 | End: 2022-03-28 | Stop reason: HOSPADM

## 2022-03-28 RX ADMIN — Medication 10 ML: at 08:10

## 2022-03-28 RX ADMIN — KETOROLAC TROMETHAMINE 15 MG: 30 INJECTION, SOLUTION INTRAMUSCULAR; INTRAVENOUS at 04:05

## 2022-03-28 RX ADMIN — SODIUM CHLORIDE, POTASSIUM CHLORIDE, SODIUM LACTATE AND CALCIUM CHLORIDE 125 ML/HR: 600; 310; 30; 20 INJECTION, SOLUTION INTRAVENOUS at 08:10

## 2022-03-28 RX ADMIN — ONDANSETRON HYDROCHLORIDE 4 MG: 2 SOLUTION INTRAMUSCULAR; INTRAVENOUS at 08:09

## 2022-03-28 RX ADMIN — POTASSIUM CHLORIDE AND SODIUM CHLORIDE 125 ML/HR: 900; 150 INJECTION, SOLUTION INTRAVENOUS at 07:40

## 2022-04-01 LAB
BACTERIA SPEC AEROBE CULT: NORMAL
BACTERIA SPEC AEROBE CULT: NORMAL

## 2022-11-10 ENCOUNTER — HOSPITAL ENCOUNTER (EMERGENCY)
Facility: HOSPITAL | Age: 37
Discharge: HOME | End: 2022-11-10
Attending: EMERGENCY MEDICINE | Admitting: EMERGENCY MEDICINE

## 2022-11-10 ENCOUNTER — APPOINTMENT (OUTPATIENT)
Dept: CT IMAGING | Facility: HOSPITAL | Age: 37
End: 2022-11-10

## 2022-11-10 VITALS
HEIGHT: 63 IN | WEIGHT: 158 LBS | RESPIRATION RATE: 16 BRPM | BODY MASS INDEX: 28 KG/M2 | HEART RATE: 84 BPM | SYSTOLIC BLOOD PRESSURE: 116 MMHG | OXYGEN SATURATION: 98 % | TEMPERATURE: 98.5 F | DIASTOLIC BLOOD PRESSURE: 69 MMHG

## 2022-11-10 DIAGNOSIS — N39.0 ACUTE UTI (URINARY TRACT INFECTION): Primary | ICD-10-CM

## 2022-11-10 DIAGNOSIS — N20.2 RENAL AND URETERIC CALCULUS: ICD-10-CM

## 2022-11-10 LAB
ALBUMIN SERPL-MCNC: 4.6 G/DL (ref 3.5–5.2)
ALBUMIN/GLOB SERPL: 1.7 G/DL
ALP SERPL-CCNC: 56 U/L (ref 39–117)
ALT SERPL W P-5'-P-CCNC: 10 U/L (ref 1–33)
ANION GAP SERPL CALCULATED.3IONS-SCNC: 9 MMOL/L (ref 5–15)
AST SERPL-CCNC: 10 U/L (ref 1–32)
BACTERIA UR QL AUTO: ABNORMAL /HPF
BASOPHILS # BLD AUTO: 0.04 10*3/MM3 (ref 0–0.2)
BASOPHILS NFR BLD AUTO: 0.6 % (ref 0–1.5)
BILIRUB SERPL-MCNC: 0.7 MG/DL (ref 0–1.2)
BILIRUB UR QL STRIP: NEGATIVE
BUN SERPL-MCNC: 13 MG/DL (ref 6–20)
BUN/CREAT SERPL: 14.6 (ref 7–25)
CALCIUM SPEC-SCNC: 9.8 MG/DL (ref 8.6–10.5)
CHLORIDE SERPL-SCNC: 103 MMOL/L (ref 98–107)
CLARITY UR: ABNORMAL
CO2 SERPL-SCNC: 27 MMOL/L (ref 22–29)
COLOR UR: YELLOW
CREAT SERPL-MCNC: 0.89 MG/DL (ref 0.57–1)
CRP SERPL-MCNC: <0.3 MG/DL (ref 0–0.5)
DEPRECATED RDW RBC AUTO: 44.4 FL (ref 37–54)
EGFRCR SERPLBLD CKD-EPI 2021: 85.2 ML/MIN/1.73
EOSINOPHIL # BLD AUTO: 0.14 10*3/MM3 (ref 0–0.4)
EOSINOPHIL NFR BLD AUTO: 2 % (ref 0.3–6.2)
ERYTHROCYTE [DISTWIDTH] IN BLOOD BY AUTOMATED COUNT: 13.2 % (ref 12.3–15.4)
GLOBULIN UR ELPH-MCNC: 2.7 GM/DL
GLUCOSE SERPL-MCNC: 88 MG/DL (ref 65–99)
GLUCOSE UR STRIP-MCNC: NEGATIVE MG/DL
HCG SERPL QL: NEGATIVE
HCT VFR BLD AUTO: 41.7 % (ref 34–46.6)
HGB BLD-MCNC: 13.4 G/DL (ref 12–15.9)
HGB UR QL STRIP.AUTO: ABNORMAL
HYALINE CASTS UR QL AUTO: ABNORMAL /LPF
IMM GRANULOCYTES # BLD AUTO: 0.02 10*3/MM3 (ref 0–0.05)
IMM GRANULOCYTES NFR BLD AUTO: 0.3 % (ref 0–0.5)
KETONES UR QL STRIP: NEGATIVE
LEUKOCYTE ESTERASE UR QL STRIP.AUTO: ABNORMAL
LYMPHOCYTES # BLD AUTO: 1.85 10*3/MM3 (ref 0.7–3.1)
LYMPHOCYTES NFR BLD AUTO: 26.2 % (ref 19.6–45.3)
MCH RBC QN AUTO: 29.6 PG (ref 26.6–33)
MCHC RBC AUTO-ENTMCNC: 32.1 G/DL (ref 31.5–35.7)
MCV RBC AUTO: 92.3 FL (ref 79–97)
MONOCYTES # BLD AUTO: 0.34 10*3/MM3 (ref 0.1–0.9)
MONOCYTES NFR BLD AUTO: 4.8 % (ref 5–12)
NEUTROPHILS NFR BLD AUTO: 4.68 10*3/MM3 (ref 1.7–7)
NEUTROPHILS NFR BLD AUTO: 66.1 % (ref 42.7–76)
NITRITE UR QL STRIP: NEGATIVE
NRBC BLD AUTO-RTO: 0 /100 WBC (ref 0–0.2)
PH UR STRIP.AUTO: 5.5 [PH] (ref 5–8)
PLATELET # BLD AUTO: 333 10*3/MM3 (ref 140–450)
PMV BLD AUTO: 10.7 FL (ref 6–12)
POTASSIUM SERPL-SCNC: 3.9 MMOL/L (ref 3.5–5.2)
PROCALCITONIN SERPL-MCNC: 0.02 NG/ML (ref 0–0.25)
PROT SERPL-MCNC: 7.3 G/DL (ref 6–8.5)
PROT UR QL STRIP: ABNORMAL
RBC # BLD AUTO: 4.52 10*6/MM3 (ref 3.77–5.28)
RBC # UR STRIP: ABNORMAL /HPF
REF LAB TEST METHOD: ABNORMAL
SODIUM SERPL-SCNC: 139 MMOL/L (ref 136–145)
SP GR UR STRIP: 1.02 (ref 1–1.03)
SQUAMOUS #/AREA URNS HPF: ABNORMAL /HPF
UROBILINOGEN UR QL STRIP: ABNORMAL
WBC # UR STRIP: ABNORMAL /HPF
WBC NRBC COR # BLD: 7.07 10*3/MM3 (ref 3.4–10.8)

## 2022-11-10 PROCEDURE — 84703 CHORIONIC GONADOTROPIN ASSAY: CPT | Performed by: EMERGENCY MEDICINE

## 2022-11-10 PROCEDURE — 25010000002 ONDANSETRON PER 1 MG: Performed by: EMERGENCY MEDICINE

## 2022-11-10 PROCEDURE — 25010000002 CEFTRIAXONE PER 250 MG: Performed by: EMERGENCY MEDICINE

## 2022-11-10 PROCEDURE — 25010000002 KETOROLAC TROMETHAMINE PER 15 MG: Performed by: EMERGENCY MEDICINE

## 2022-11-10 PROCEDURE — 86140 C-REACTIVE PROTEIN: CPT | Performed by: EMERGENCY MEDICINE

## 2022-11-10 PROCEDURE — 84145 PROCALCITONIN (PCT): CPT | Performed by: EMERGENCY MEDICINE

## 2022-11-10 PROCEDURE — 87086 URINE CULTURE/COLONY COUNT: CPT | Performed by: EMERGENCY MEDICINE

## 2022-11-10 PROCEDURE — 85025 COMPLETE CBC W/AUTO DIFF WBC: CPT | Performed by: EMERGENCY MEDICINE

## 2022-11-10 PROCEDURE — 81001 URINALYSIS AUTO W/SCOPE: CPT | Performed by: EMERGENCY MEDICINE

## 2022-11-10 PROCEDURE — 96375 TX/PRO/DX INJ NEW DRUG ADDON: CPT

## 2022-11-10 PROCEDURE — 87077 CULTURE AEROBIC IDENTIFY: CPT | Performed by: EMERGENCY MEDICINE

## 2022-11-10 PROCEDURE — 96365 THER/PROPH/DIAG IV INF INIT: CPT

## 2022-11-10 PROCEDURE — 87186 SC STD MICRODIL/AGAR DIL: CPT | Performed by: EMERGENCY MEDICINE

## 2022-11-10 PROCEDURE — 36415 COLL VENOUS BLD VENIPUNCTURE: CPT

## 2022-11-10 PROCEDURE — 99283 EMERGENCY DEPT VISIT LOW MDM: CPT

## 2022-11-10 PROCEDURE — 74176 CT ABD & PELVIS W/O CONTRAST: CPT

## 2022-11-10 PROCEDURE — 80053 COMPREHEN METABOLIC PANEL: CPT | Performed by: EMERGENCY MEDICINE

## 2022-11-10 RX ORDER — ONDANSETRON 2 MG/ML
4 INJECTION INTRAMUSCULAR; INTRAVENOUS ONCE
Status: COMPLETED | OUTPATIENT
Start: 2022-11-10 | End: 2022-11-10

## 2022-11-10 RX ORDER — CIPROFLOXACIN 500 MG/1
500 TABLET, FILM COATED ORAL 2 TIMES DAILY
Qty: 14 TABLET | Refills: 0 | Status: SHIPPED | OUTPATIENT
Start: 2022-11-10 | End: 2022-11-17

## 2022-11-10 RX ORDER — KETOROLAC TROMETHAMINE 10 MG/1
10 TABLET, FILM COATED ORAL EVERY 6 HOURS PRN
Qty: 10 TABLET | Refills: 0 | Status: SHIPPED | OUTPATIENT
Start: 2022-11-10

## 2022-11-10 RX ORDER — ONDANSETRON 4 MG/1
4 TABLET, FILM COATED ORAL EVERY 6 HOURS
Qty: 15 TABLET | Refills: 0 | Status: SHIPPED | OUTPATIENT
Start: 2022-11-10

## 2022-11-10 RX ORDER — OXYCODONE AND ACETAMINOPHEN 7.5; 325 MG/1; MG/1
1 TABLET ORAL EVERY 6 HOURS PRN
Qty: 6 TABLET | Refills: 0 | Status: SHIPPED | OUTPATIENT
Start: 2022-11-10

## 2022-11-10 RX ORDER — KETOROLAC TROMETHAMINE 30 MG/ML
30 INJECTION, SOLUTION INTRAMUSCULAR; INTRAVENOUS ONCE
Status: COMPLETED | OUTPATIENT
Start: 2022-11-10 | End: 2022-11-10

## 2022-11-10 RX ADMIN — ONDANSETRON 4 MG: 2 INJECTION INTRAMUSCULAR; INTRAVENOUS at 07:30

## 2022-11-10 RX ADMIN — SODIUM CHLORIDE 1 G: 9 INJECTION, SOLUTION INTRAVENOUS at 09:02

## 2022-11-10 RX ADMIN — KETOROLAC TROMETHAMINE 30 MG: 30 INJECTION, SOLUTION INTRAMUSCULAR; INTRAVENOUS at 07:30

## 2022-11-10 RX ADMIN — SODIUM CHLORIDE 1000 ML: 9 INJECTION, SOLUTION INTRAVENOUS at 07:30

## 2022-11-10 NOTE — ED PROVIDER NOTES
Subjective   History of Present Illness  Patient has right-sided flank pain started yesterday at 3 AM has gotten worse.  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  Last admissions for the same thing revealed nothing acutely abnormal.  Last 2 CT scans have been negative.  Came to the ER today with right flank pain.    Flank Pain  Pain location:  R flank  Pain quality: sharp and stabbing    Pain radiates to:  Does not radiate  Pain severity:  Moderate  Onset quality:  Sudden  Timing:  Constant  Progression:  Worsening  Chronicity:  New  Context: not alcohol use, not awakening from sleep, not diet changes, not eating, not previous surgeries, not recent illness, not recent travel, not retching, not sick contacts, not suspicious food intake and not trauma    Relieved by:  Nothing  Worsened by:  Nothing  Ineffective treatments:  None tried  Associated symptoms: nausea    Associated symptoms: no anorexia, no belching, no chills, no constipation, no cough, no fever, no flatus, no hematemesis, no hematochezia, no shortness of breath, no sore throat, no vaginal bleeding and no vomiting    Risk factors: no NSAID use and no recent hospitalization        Review of Systems   Constitutional: Negative.  Negative for chills and fever.   HENT: Negative.  Negative for sore throat.    Eyes: Negative.    Respiratory: Negative.  Negative for cough and shortness of breath.    Cardiovascular: Negative.    Gastrointestinal: Positive for nausea. Negative for anorexia, constipation, flatus, hematemesis, hematochezia and vomiting.   Endocrine: Negative.    Genitourinary: Positive for flank pain. Negative for vaginal bleeding.   Skin: Negative.    Neurological: Negative.    Hematological: Negative.    All other systems reviewed and are negative.      Past Medical History:   Diagnosis Date   • Anxiety    • Depression    • PCOS (polycystic ovarian syndrome)     • Polycystic ovary syndrome    • UTI (urinary tract infection)        Allergies   Allergen Reactions   • Sulfa Antibiotics Anaphylaxis   • Norco [Hydrocodone-Acetaminophen] GI Intolerance       Past Surgical History:   Procedure Laterality Date   • ABDOMINAL SURGERY      transabdominal cerclage   • ABDOMINAL SURGERY      TAC   • CERVICAL BIOPSY  W/ LOOP ELECTRODE EXCISION     •  SECTION      x 2   • KIDNEY SURGERY     • TONSILLECTOMY AND ADENOIDECTOMY     • TUBAL ABDOMINAL LIGATION         Family History   Problem Relation Age of Onset   • Stroke Father    • Endometrial cancer Mother    • No Known Problems Brother    • No Known Problems Maternal Grandmother    • No Known Problems Maternal Grandfather    • No Known Problems Paternal Grandmother    • Breast cancer Neg Hx    • Ovarian cancer Neg Hx    • Colon cancer Neg Hx        Social History     Socioeconomic History   • Marital status:    Tobacco Use   • Smoking status: Never   • Smokeless tobacco: Never   Substance and Sexual Activity   • Alcohol use: No   • Drug use: No   • Sexual activity: Yes     Partners: Male     Birth control/protection: Surgical           Objective   Physical Exam  Vitals and nursing note reviewed. Exam conducted with a chaperone present.   Constitutional:       General: She is awake. She is not in acute distress.     Appearance: Normal appearance. She is well-developed. She is not toxic-appearing.   HENT:      Head: Normocephalic and atraumatic.      Nose:      Right Nostril: No epistaxis.      Left Nostril: No epistaxis.   Eyes:      General: Lids are normal. No scleral icterus.     Conjunctiva/sclera: Conjunctivae normal.      Pupils: Pupils are equal, round, and reactive to light.   Neck:      Vascular: No hepatojugular reflux or JVD.   Cardiovascular:      Rate and Rhythm: Normal rate and regular rhythm.      Chest Wall: PMI is not displaced.      Pulses: Normal pulses. No decreased pulses.      Heart sounds:  Normal heart sounds. No murmur heard.  Pulmonary:      Effort: Pulmonary effort is normal. No accessory muscle usage or respiratory distress.      Breath sounds: Normal breath sounds. No decreased breath sounds or wheezing.   Abdominal:      General: Abdomen is flat. Bowel sounds are normal. There is no distension or abdominal bruit.      Palpations: Abdomen is soft. There is no shifting dullness, fluid wave, mass or pulsatile mass.      Tenderness: There is no abdominal tenderness. There is right CVA tenderness. There is no left CVA tenderness, guarding or rebound.      Hernia: No hernia is present.   Musculoskeletal:         General: Normal range of motion.      Cervical back: Normal range of motion and neck supple. No rigidity.      Right lower leg: No edema.      Left lower leg: No edema.   Skin:     General: Skin is warm and dry.      Capillary Refill: Capillary refill takes less than 2 seconds.      Coloration: Skin is not cyanotic, jaundiced, mottled or pale.   Neurological:      General: No focal deficit present.      Mental Status: She is alert and oriented to person, place, and time. Mental status is at baseline.      GCS: GCS eye subscore is 4. GCS verbal subscore is 5. GCS motor subscore is 6.      Cranial Nerves: Cranial nerves are intact. No cranial nerve deficit.      Sensory: Sensation is intact.      Motor: Motor function is intact.      Deep Tendon Reflexes: Reflexes are normal and symmetric.   Psychiatric:         Behavior: Behavior normal. Behavior is cooperative.         Procedures           ED Course  ED Course as of 11/10/22 0910   Thu Nov 10, 2022   0903 Patient is in the ED with right flank pain has got a UTI and small distal ureteric calculi she also has some chronic calculus in the left kidney with no obstruction and a left UPJ stenosis which is a prior history.  She does not appear to be toxic her procalcitonin level is negative White cell count is normal she is not febrile not  tachycardic or hypotensive there is no evidence of any sepsis.  I have discussed this case at length with the patient and I also discussed this case the patient's primary MD recommendation was to discharge patient home pain medication antibiotics and follow-up. [TS]   0903 This patient presents with abdominal pain arrived hemodynamically stable.  Presentation not consistent with other acute, emergent causes of abdominal pain at this time.  Low suspicion for dissection there is no widened mediastinum, hypotension, pulses deficits, and no pain tearing through to the back.  L  Low suspicion for viscus perforation without evidence of guarding, rebound, or rigidity that would be concerning for an acute abdomen.  Low concern for appendicitis as pain did not radiate from the umbilicus to the right lower quadrant, negative Rovsing's sign, no severe constipation on exam.  Low concern for cholecystitis with negative Cortez sign, no history of gallstones, and no recent pale stools or pain after eating.  Low concern for ulcerative disease as pain is not consistent with eating  foods and is not relieved with patient refraining from eatingand there is no hematemesis or melena. Low suspicion for GI bleeding as there is no melena hematemesis or hematochezia and patient's hemodynamics are stable and hemoglobin is at its baseline.  Low suspicion for gastroenteritis without evidence of diarrhea, fevers, or recent uncooked food exposure.  There is low concern for ischemic bowel with no significant abdominal pain normal vitals and no acidosis no history of peripheral vascular disease or cardiac dysrhythmias.      Patient has got right distal ureteric calculus which is small.  With a UTI          [TS]   0904 Risks and benefits of treatments given and alternative treatment options discussed with patient/family. I answered all the questions in simple, plain language, and there was voiced understanding and agreement with plan of care. There  were no further questions. Differential diagnosis discussed. Patient/family was advised that the practice of medicine is not always an exact science, and sometimes tests, physical exam, or history may not show the underlying conditions with certainty. Additionally, the condition may change or show itself later after initial presentation. There was also expressed understanding and agreement with this limitation of emergency medicine practice. Patient/family was asked to return to ED if any problem or issues or if condition worsens or does not improved. Patient/family agreed to follow up with PCP/specialist as advised, or return to ED if unable to see a provider in a timely fashion for continued symptoms.  [TS]      ED Course User Index  [TS] Stas Vitale MD                                   [facility] reviewed by Sats Vitale MD       MDM  Number of Diagnoses or Management Options  Diagnosis management comments: DD   mesenteric lymphadenitis, diverticulitis, intussusception, small bowel obstruction, adhesions, bowel ischemia,intraabdominal abscess, spontaneous bacterial peritonitis, hernia, urinary tract infection, ureterolithiasis,acute appendicitis, abdominal aortic aneurysm, pneumonia, porphyria and diabetic ketoacidosis as a possible cause of abdominal pain in this patient. This is a partial list of diagnoses considered.         Amount and/or Complexity of Data Reviewed  Clinical lab tests: ordered and reviewed  Tests in the radiology section of CPT®: ordered and reviewed  Tests in the medicine section of CPT®: reviewed and ordered    Risk of Complications, Morbidity, and/or Mortality  Presenting problems: low  Diagnostic procedures: low  Management options: low        Final diagnoses:   Acute UTI (urinary tract infection)   Renal and ureteric calculus       ED Disposition  ED Disposition     ED Disposition   Discharge    Condition   Stable    Comment   --             Crispin Eller MD  [address] [place] [address]  [facility]  [place] [address]  [phone]    Schedule an appointment as soon as possible for a visit            Medication List      New Prescriptions    ciprofloxacin 500 MG tablet  Commonly known as: CIPRO  Take 1 tablet by mouth 2 (Two) Times a Day for 7 days.     ketorolac 10 MG tablet  Commonly known as: TORADOL  Take 1 tablet by mouth Every 6 (Six) Hours As Needed for Moderate Pain.     ondansetron 4 MG tablet  Commonly known as: ZOFRAN  Take 1 tablet by mouth Every 6 (Six) Hours.        Changed    oxyCODONE-acetaminophen 7.5-325 MG per tablet  Commonly known as: PERCOCET  Take 1 tablet by mouth Every 6 (Six) Hours As Needed for Moderate Pain.  What changed: when to take this        Stop    albuterol sulfate  (90 Base) MCG/ACT inhaler  Commonly known as: PROVENTIL HFA;VENTOLIN HFA;PROAIR HFA     ALPRAZolam 0.25 MG tablet  Commonly known as: XANAX     azithromycin 250 MG tablet  Commonly known as: ZITHROMAX     cefdinir 300 MG capsule  Commonly known as: OMNICEF     cyclobenzaprine 10 MG tablet  Commonly known as: FLEXERIL     fluconazole 150 MG tablet  Commonly known as: DIFLUCAN     HYDROcodone-acetaminophen 5-325 MG per tablet  Commonly known as: NORCO     metFORMIN 500 MG tablet  Commonly known as: GLUCOPHAGE     methylPREDNISolone 4 MG dose pack  Commonly known as: MEDROL     prochlorperazine 10 MG tablet  Commonly known as: COMPAZINE     SUMAtriptan 50 MG tablet  Commonly known as: IMITREX     Wegovy 1 MG/0.5ML solution auto-injector  Generic drug: Semaglutide-Weight Management     Wegovy 1.7 MG/0.75ML solution auto-injector  Generic drug: Semaglutide-Weight Management     Wegovy 2.4 MG/0.75ML solution auto-injector  Generic drug: Semaglutide-Weight Management           Where to Get Your Medications      These medications were sent to [facility] - [address], [facility] [address]    Hours: 7AM-5PM Mon-Fri Phone: [phone]   · ciprofloxacin 500 MG  tablet  · ketorolac 10 MG tablet  · ondansetron 4 MG tablet  · oxyCODONE-acetaminophen 7.5-325 MG per tablet          Stas Vitale MD  11/10/22 0656       Stas Vitale MD  11/10/22 4382

## 2022-11-12 LAB — BACTERIA SPEC AEROBE CULT: ABNORMAL

## 2023-03-07 ENCOUNTER — LAB (OUTPATIENT)
Dept: LAB | Facility: HOSPITAL | Age: 38
End: 2023-03-07
Payer: COMMERCIAL

## 2023-03-07 ENCOUNTER — TRANSCRIBE ORDERS (OUTPATIENT)
Dept: ADMINISTRATIVE | Facility: HOSPITAL | Age: 38
End: 2023-03-07
Payer: COMMERCIAL

## 2023-03-07 DIAGNOSIS — F33.42 MAJOR DEPRESSIVE DISORDER, RECURRENT EPISODE, IN FULL REMISSION: ICD-10-CM

## 2023-03-07 DIAGNOSIS — E28.2 POLYCYSTIC OVARIAN SYNDROME: ICD-10-CM

## 2023-03-07 DIAGNOSIS — Z00.01 ENCOUNTER FOR GENERAL ADULT MEDICAL EXAMINATION WITH ABNORMAL FINDINGS: ICD-10-CM

## 2023-03-07 DIAGNOSIS — Z00.01 ENCOUNTER FOR GENERAL ADULT MEDICAL EXAMINATION WITH ABNORMAL FINDINGS: Primary | ICD-10-CM

## 2023-03-07 LAB
25(OH)D3 SERPL-MCNC: 28.1 NG/ML (ref 30–100)
ALBUMIN SERPL-MCNC: 4.1 G/DL (ref 3.5–5.2)
ALBUMIN/GLOB SERPL: 1.5 G/DL
ALP SERPL-CCNC: 53 U/L (ref 39–117)
ALT SERPL W P-5'-P-CCNC: 8 U/L (ref 1–33)
ANION GAP SERPL CALCULATED.3IONS-SCNC: 9.1 MMOL/L (ref 5–15)
AST SERPL-CCNC: 10 U/L (ref 1–32)
BASOPHILS # BLD AUTO: 0.04 10*3/MM3 (ref 0–0.2)
BASOPHILS NFR BLD AUTO: 0.6 % (ref 0–1.5)
BILIRUB SERPL-MCNC: 0.4 MG/DL (ref 0–1.2)
BUN SERPL-MCNC: 12 MG/DL (ref 6–20)
BUN/CREAT SERPL: 17.1 (ref 7–25)
CALCIUM SPEC-SCNC: 9.8 MG/DL (ref 8.6–10.5)
CHLORIDE SERPL-SCNC: 105 MMOL/L (ref 98–107)
CHOLEST SERPL-MCNC: 180 MG/DL (ref 0–200)
CO2 SERPL-SCNC: 24.9 MMOL/L (ref 22–29)
CREAT SERPL-MCNC: 0.7 MG/DL (ref 0.57–1)
DEPRECATED RDW RBC AUTO: 41.5 FL (ref 37–54)
EGFRCR SERPLBLD CKD-EPI 2021: 113.7 ML/MIN/1.73
EOSINOPHIL # BLD AUTO: 0.13 10*3/MM3 (ref 0–0.4)
EOSINOPHIL NFR BLD AUTO: 2 % (ref 0.3–6.2)
ERYTHROCYTE [DISTWIDTH] IN BLOOD BY AUTOMATED COUNT: 12.5 % (ref 12.3–15.4)
FOLATE SERPL-MCNC: 6.64 NG/ML (ref 4.78–24.2)
GLOBULIN UR ELPH-MCNC: 2.7 GM/DL
GLUCOSE SERPL-MCNC: 80 MG/DL (ref 65–99)
HCT VFR BLD AUTO: 38.5 % (ref 34–46.6)
HDLC SERPL-MCNC: 67 MG/DL (ref 40–60)
HGB BLD-MCNC: 12.8 G/DL (ref 12–15.9)
IMM GRANULOCYTES # BLD AUTO: 0.02 10*3/MM3 (ref 0–0.05)
IMM GRANULOCYTES NFR BLD AUTO: 0.3 % (ref 0–0.5)
LDLC SERPL CALC-MCNC: 104 MG/DL (ref 0–100)
LDLC/HDLC SERPL: 1.55 {RATIO}
LYMPHOCYTES # BLD AUTO: 2.26 10*3/MM3 (ref 0.7–3.1)
LYMPHOCYTES NFR BLD AUTO: 34 % (ref 19.6–45.3)
MCH RBC QN AUTO: 30.3 PG (ref 26.6–33)
MCHC RBC AUTO-ENTMCNC: 33.2 G/DL (ref 31.5–35.7)
MCV RBC AUTO: 91.2 FL (ref 79–97)
MONOCYTES # BLD AUTO: 0.47 10*3/MM3 (ref 0.1–0.9)
MONOCYTES NFR BLD AUTO: 7.1 % (ref 5–12)
NEUTROPHILS NFR BLD AUTO: 3.72 10*3/MM3 (ref 1.7–7)
NEUTROPHILS NFR BLD AUTO: 56 % (ref 42.7–76)
NRBC BLD AUTO-RTO: 0 /100 WBC (ref 0–0.2)
PLATELET # BLD AUTO: 281 10*3/MM3 (ref 140–450)
PMV BLD AUTO: 11.3 FL (ref 6–12)
POTASSIUM SERPL-SCNC: 4.5 MMOL/L (ref 3.5–5.2)
PROT SERPL-MCNC: 6.8 G/DL (ref 6–8.5)
RBC # BLD AUTO: 4.22 10*6/MM3 (ref 3.77–5.28)
SODIUM SERPL-SCNC: 139 MMOL/L (ref 136–145)
T4 FREE SERPL-MCNC: 1.23 NG/DL (ref 0.93–1.7)
TRIGL SERPL-MCNC: 45 MG/DL (ref 0–150)
TSH SERPL DL<=0.05 MIU/L-ACNC: 2.08 UIU/ML (ref 0.27–4.2)
VIT B12 BLD-MCNC: 232 PG/ML (ref 211–946)
VLDLC SERPL-MCNC: 9 MG/DL (ref 5–40)
WBC NRBC COR # BLD: 6.64 10*3/MM3 (ref 3.4–10.8)

## 2023-03-07 PROCEDURE — 82306 VITAMIN D 25 HYDROXY: CPT

## 2023-03-07 PROCEDURE — 84403 ASSAY OF TOTAL TESTOSTERONE: CPT

## 2023-03-07 PROCEDURE — 83525 ASSAY OF INSULIN: CPT

## 2023-03-07 PROCEDURE — 84402 ASSAY OF FREE TESTOSTERONE: CPT

## 2023-03-07 PROCEDURE — 80061 LIPID PANEL: CPT

## 2023-03-07 PROCEDURE — 84439 ASSAY OF FREE THYROXINE: CPT

## 2023-03-07 PROCEDURE — 82607 VITAMIN B-12: CPT

## 2023-03-07 PROCEDURE — 36415 COLL VENOUS BLD VENIPUNCTURE: CPT

## 2023-03-07 PROCEDURE — 82746 ASSAY OF FOLIC ACID SERUM: CPT

## 2023-03-07 PROCEDURE — 80050 GENERAL HEALTH PANEL: CPT

## 2023-03-11 LAB
TESTOST FREE SERPL-MCNC: 7.1 PG/ML (ref 0–4.2)
TESTOST SERPL-MCNC: 52 NG/DL (ref 8–60)

## 2023-03-14 LAB — INSULIN SERPL-ACNC: 5.6 UIU/ML
